# Patient Record
Sex: FEMALE | Race: BLACK OR AFRICAN AMERICAN | Employment: FULL TIME | URBAN - METROPOLITAN AREA
[De-identification: names, ages, dates, MRNs, and addresses within clinical notes are randomized per-mention and may not be internally consistent; named-entity substitution may affect disease eponyms.]

---

## 2017-12-06 ENCOUNTER — HOSPITAL ENCOUNTER (OUTPATIENT)
Dept: MAMMOGRAPHY | Age: 42
Discharge: HOME | End: 2017-12-06
Attending: INTERNAL MEDICINE
Payer: SELF-PAY

## 2017-12-06 DIAGNOSIS — Z12.39 SCREENING BREAST EXAMINATION: ICD-10-CM

## 2017-12-06 PROCEDURE — 77067 SCR MAMMO BI INCL CAD: CPT

## 2018-11-26 ENCOUNTER — HOSPITAL ENCOUNTER (OUTPATIENT)
Dept: MAMMOGRAPHY | Age: 43
Discharge: HOME | End: 2018-11-26
Attending: INTERNAL MEDICINE
Payer: SELF-PAY

## 2018-11-26 DIAGNOSIS — Z12.31 VISIT FOR SCREENING MAMMOGRAM: ICD-10-CM

## 2018-11-26 PROCEDURE — 77067 SCR MAMMO BI INCL CAD: CPT

## 2019-12-06 ENCOUNTER — HOSPITAL ENCOUNTER (OUTPATIENT)
Dept: MAMMOGRAPHY | Age: 44
Discharge: HOME | End: 2019-12-06
Attending: INTERNAL MEDICINE
Payer: MEDICAID

## 2019-12-06 DIAGNOSIS — Z12.31 VISIT FOR SCREENING MAMMOGRAM: ICD-10-CM

## 2019-12-06 PROCEDURE — 77067 SCR MAMMO BI INCL CAD: CPT

## 2020-11-16 ENCOUNTER — TRANSCRIBE ORDER (OUTPATIENT)
Dept: SCHEDULING | Age: 45
End: 2020-11-16

## 2020-11-16 DIAGNOSIS — Z12.31 VISIT FOR SCREENING MAMMOGRAM: Primary | ICD-10-CM

## 2020-11-17 ENCOUNTER — HOSPITAL ENCOUNTER (OUTPATIENT)
Dept: MAMMOGRAPHY | Age: 45
Discharge: HOME | End: 2020-11-17
Attending: INTERNAL MEDICINE
Payer: MEDICAID

## 2020-11-17 DIAGNOSIS — Z12.31 VISIT FOR SCREENING MAMMOGRAM: ICD-10-CM

## 2020-11-17 PROCEDURE — 77063 BREAST TOMOSYNTHESIS BI: CPT

## 2021-04-14 ENCOUNTER — TELEPHONE (OUTPATIENT)
Dept: INTERNAL MEDICINE CLINIC | Age: 46
End: 2021-04-14

## 2021-04-14 NOTE — TELEPHONE ENCOUNTER
----- Message from Natasha Aleman sent at 4/14/2021 11:45 AM EDT -----  Hello,    Patient has [facility] and Dr. Eugenio Eric is not credentialed yet.   Please move appointment

## 2021-04-21 ENCOUNTER — TELEPHONE (OUTPATIENT)
Dept: INTERNAL MEDICINE CLINIC | Age: 46
End: 2021-04-21

## 2021-04-21 DIAGNOSIS — Z30.09 UNWANTED FERTILITY: Primary | ICD-10-CM

## 2021-04-21 NOTE — TELEPHONE ENCOUNTER
Pt had to r/s her appt with you  Until end of June because you are not credentialed for [facility] . she said she only has one more refill of her birth control  And is asking if you could temporarily refill it until she is seen .  Her pharmacy is [facility]

## 2021-04-23 ENCOUNTER — TELEPHONE (OUTPATIENT)
Dept: INTERNAL MEDICINE CLINIC | Age: 46
End: 2021-04-23

## 2021-06-28 ENCOUNTER — OFFICE VISIT (OUTPATIENT)
Dept: INTERNAL MEDICINE CLINIC | Age: 46
End: 2021-06-28
Payer: MEDICAID

## 2021-06-28 VITALS
HEART RATE: 73 BPM | RESPIRATION RATE: 12 BRPM | WEIGHT: 210.8 LBS | SYSTOLIC BLOOD PRESSURE: 131 MMHG | DIASTOLIC BLOOD PRESSURE: 82 MMHG | OXYGEN SATURATION: 99 % | BODY MASS INDEX: 38.79 KG/M2 | TEMPERATURE: 97.8 F | HEIGHT: 62 IN

## 2021-06-28 DIAGNOSIS — F32.A ANXIETY AND DEPRESSION: ICD-10-CM

## 2021-06-28 DIAGNOSIS — J45.20 MILD INTERMITTENT ASTHMA WITHOUT COMPLICATION: Primary | ICD-10-CM

## 2021-06-28 DIAGNOSIS — E78.2 MIXED HYPERLIPIDEMIA: ICD-10-CM

## 2021-06-28 DIAGNOSIS — F41.9 ANXIETY AND DEPRESSION: ICD-10-CM

## 2021-06-28 DIAGNOSIS — Z80.3 FAMILY HISTORY OF BREAST CANCER IN MOTHER: ICD-10-CM

## 2021-06-28 PROCEDURE — 99214 OFFICE O/P EST MOD 30 MIN: CPT | Performed by: INTERNAL MEDICINE

## 2021-06-28 RX ORDER — IBUPROFEN 200 MG
800 TABLET ORAL
COMMUNITY
End: 2022-02-02 | Stop reason: DRUGHIGH

## 2021-06-28 RX ORDER — CITALOPRAM 10 MG/1
10 TABLET ORAL DAILY
COMMUNITY
End: 2022-02-01 | Stop reason: SDUPTHER

## 2021-06-28 RX ORDER — BISMUTH SUBSALICYLATE 262 MG
1 TABLET,CHEWABLE ORAL DAILY
COMMUNITY

## 2021-06-28 RX ORDER — ALBUTEROL SULFATE 90 UG/1
2 AEROSOL, METERED RESPIRATORY (INHALATION)
COMMUNITY
End: 2022-02-01 | Stop reason: SDUPTHER

## 2021-06-28 RX ORDER — CETIRIZINE HCL 10 MG
10 TABLET ORAL
COMMUNITY

## 2021-06-28 RX ORDER — GLUCOSAM/CHONDRO/HERB 149/HYAL 750-100 MG
1 TABLET ORAL DAILY
COMMUNITY

## 2021-06-28 RX ORDER — CHOLECALCIFEROL (VITAMIN D3) 125 MCG
5000 CAPSULE ORAL
COMMUNITY
End: 2022-02-01

## 2021-06-28 RX ORDER — FLUOCINOLONE ACETONIDE 0.11 MG/ML
OIL TOPICAL
COMMUNITY
End: 2022-02-10 | Stop reason: ALTCHOICE

## 2021-06-28 RX ORDER — MONTELUKAST SODIUM 10 MG/1
10 TABLET ORAL DAILY
COMMUNITY
End: 2021-06-28 | Stop reason: SDUPTHER

## 2021-06-28 RX ORDER — MONTELUKAST SODIUM 10 MG/1
10 TABLET ORAL DAILY
Qty: 90 TABLET | Refills: 3 | Status: SHIPPED | OUTPATIENT
Start: 2021-06-28 | End: 2022-02-01 | Stop reason: SDUPTHER

## 2021-06-28 NOTE — PROGRESS NOTES
1. Have you been to the ER, urgent care clinic or hospitalized since your last visit? NO           2. Have you seen or consulted any other health care providers outside of the [address] [place] [address] since your last visit (Include any pap smears or colon screening)? YES, Dr. Rory Izaguirre; Dr. Corin Moore, Dentist; Dr Gurwinder Del Cid, Optho    Do you have an Advanced Directive? YES    Would you like information on Advanced Directives?  NO

## 2021-06-28 NOTE — PROGRESS NOTES
HPI     Jayce Sheridan. Belinda Pozo is a 78-year-old female, known to me from my previous practice. She says her SSRI is controlling her anxiety very well. She takes Mobic sporadically, not even weekly. On questioning, she uses her rescue inhaler 2-3 times weekly, helpful. She states she has had both her Covid vaccines. Past Medical History:   Diagnosis Date    Allergic rhinitis     Anxiety and depression     Family history of breast cancer in mother     Mild intermittent asthma     Mixed hyperlipidemia         Past Surgical History:   Procedure Laterality Date    HX LEEP PROCEDURE          Current Outpatient Medications   Medication Sig Dispense Refill    citalopram (CeleXA) 10 mg tablet Take 10 mg by mouth daily.  cetirizine (ZyrTEC) 10 mg tablet Take 10 mg by mouth.  albuterol (PROVENTIL HFA, VENTOLIN HFA, PROAIR HFA) 90 mcg/actuation inhaler Take 2 Puffs by inhalation.  ibuprofen (Motrin IB) 200 mg tablet Take 800 mg by mouth.  biotin 5,000 mcg TbDi Take 5,000 mg by mouth.  omega 3-DHA-EPA-fish oil (Fish Oil) 1,000 mg (120 mg-180 mg) capsule Take 1 Capsule by mouth daily.  COLLAGEN by Does Not Apply route.  FLAXSEED OIL PO Take 1,400 mg by mouth.  multivitamin (ONE A DAY) tablet Take 1 Tablet by mouth daily.  fluocinolone 0.01 % oil by Scalp route.  wheat dextrin/calcium/aspartam (BENEFIBER + CALCIUM SUGAR-FREE PO) Take  by mouth.  COD LIVER OIL PO Take  by mouth.  montelukast (Singulair) 10 mg tablet Take 1 Tablet by mouth daily. 90 Tablet 3    norethindrone-ethinyl estradiol (ORTHO-NOVUM 1-35 TAB, NORTREL 1-35 TAB) 1-35 mg-mcg tab Take 1 Tab by mouth daily.  3 Package 3        No Known Allergies     Social History     Socioeconomic History    Marital status: SINGLE     Spouse name: Not on file    Number of children: Not on file    Years of education: Not on file    Highest education level: Not on file   Occupational History    Not on file Tobacco Use    Smoking status: Former Smoker     Packs/day: 0.10     Years: 3.00     Pack years: 0.30    Smokeless tobacco: Never Used   Vaping Use    Vaping Use: Never used   Substance and Sexual Activity    Alcohol use: Not on file    Drug use: Not on file    Sexual activity: Not on file   Other Topics Concern    Not on file   Social History Narrative    Not on file     Social Determinants of Health     Financial Resource Strain:     Difficulty of Paying Living Expenses:    Food Insecurity:     Worried About Running Out of Food in the Last Year:     [address] in the Last Year:    Transportation Needs:     Lack of Transportation (Medical):  Lack of Transportation (Non-Medical):    Physical Activity:     Days of Exercise per Week:     Minutes of Exercise per Session:    Stress:     Feeling of Stress :    Social Connections:     Frequency of Communication with Friends and Family:     Frequency of Social Gatherings with Friends and Family:     Attends Shinto Services:     Active Member of Clubs or Organizations:     Attends Club or Organization Meetings:     Marital Status:    Intimate Partner Violence:     Fear of Current or Ex-Partner:     Emotionally Abused:     Physically Abused:     Sexually Abused:         Family History   Problem Relation Age of Onset    Breast Cancer Mother         at 55    Diabetes Sister         type 2           Visit Vitals  /82 (BP 1 Location: Right upper arm, BP Patient Position: Sitting)   Pulse 73   Temp 97.8 °F (36.6 °C) (Temporal)   Resp 12   Ht 5' 2\" (1.575 m)   Wt 210 lb 12.8 oz (95.6 kg)   SpO2 99%   BMI 38.56 kg/m²        Review of Systems   Eyes: Negative for blurred vision. Respiratory: Negative for shortness of breath. Cardiovascular: Negative for chest pain. Gastrointestinal: Negative for blood in stool. Genitourinary: Negative for hematuria. Neurological: Negative for headaches.    Psychiatric/Behavioral: Negative for depression. The patient is not nervous/anxious. Physical Exam  Constitutional:       General: She is not in acute distress. Appearance: She is obese. Eyes:      General: No scleral icterus. Conjunctiva/sclera: Conjunctivae normal.   Neck:      Comments: Normal carotid upstrokes. Cardiovascular:      Rate and Rhythm: Normal rate and regular rhythm. Pulses: Normal pulses. Heart sounds: No friction rub. No gallop. Pulmonary:      Effort: Pulmonary effort is normal.      Breath sounds: Normal breath sounds. Abdominal:      General: Abdomen is flat. Palpations: Abdomen is soft. Tenderness: There is no abdominal tenderness. Musculoskeletal:      Cervical back: Neck supple. Comments: No clubbing, cyanosis, or edema. Skin:     General: Skin is warm and dry. Neurological:      Mental Status: She is alert and oriented to person, place, and time. Psychiatric:         Mood and Affect: Mood normal.                  Diagnoses and all orders for this visit:    1. Mild intermittent asthma without complication  Comments:  Controlled, refill Singulair. Advise me if having to use rescue inhaler more than 2-3 times weekly on a regular basis. 2. Mixed hyperlipidemia  Comments:  Fasting panel shortly before next visit. Orders:  -     LIPID PANEL; Future  -     METABOLIC PANEL, COMPREHENSIVE; Future  -     URINALYSIS W/MICROSCOPIC; Future    3. Anxiety and depression  Comments:  Controlled, refill SSRI    4. Family history of breast cancer in mother  Comments:  Up-to-date mammogram November 2020, will do breast exam next visit. Other orders  -     montelukast (Singulair) 10 mg tablet; Take 1 Tablet by mouth daily. Follow-up and Dispositions    · Return in about 28 weeks (around 1/10/2022) for 30 minute physical-- schedule labs 1 or 2 wks before.               Shellie Nicholson MD

## 2021-11-12 ENCOUNTER — TELEPHONE (OUTPATIENT)
Dept: INTERNAL MEDICINE CLINIC | Age: 46
End: 2021-11-12

## 2021-11-12 DIAGNOSIS — R05.9 COUGH: Primary | ICD-10-CM

## 2021-11-12 RX ORDER — HYDROCODONE POLISTIREX AND CHLORPHENIRAMINE POLISTIREX 10; 8 MG/5ML; MG/5ML
5 SUSPENSION, EXTENDED RELEASE ORAL
Qty: 120 ML | Refills: 0 | Status: SHIPPED | OUTPATIENT
Start: 2021-11-12 | End: 2021-11-19 | Stop reason: ALTCHOICE

## 2021-11-12 NOTE — TELEPHONE ENCOUNTER
Pt has had a cough (no fever) that started 11/09- to has been treating it with robitussin she states it is not helping she said she has this before and ws prescribed Tussinex and it helped .  she is asking if you could call something into her pharmacy

## 2021-11-17 ENCOUNTER — DOCUMENTATION ONLY (OUTPATIENT)
Dept: INTERNAL MEDICINE CLINIC | Age: 46
End: 2021-11-17

## 2021-11-17 ENCOUNTER — TELEPHONE (OUTPATIENT)
Dept: INTERNAL MEDICINE CLINIC | Age: 46
End: 2021-11-17

## 2021-11-17 NOTE — TELEPHONE ENCOUNTER
----- Message from Everett Gonzalez LPN sent at 01/63/4634 11:59 AM EST -----  Regarding: FW: Trevormartharakel     ----- Message -----  From: Siobhan Dowell  Sent: 11/16/2021  10:41 AM EST  To: [facility]  Subject: Jonox                                         [facility] would not fill the rx for the full rx you requested, they only did a 7 day supply. I was informed that you would have to send in another rx for the remainder. ..this has really been helping alot with the might time cough. .not for sure if you will fill the remaining RX without being seen. I know I have an upcoming appt in January. Thank you for your assistance.

## 2021-11-17 NOTE — TELEPHONE ENCOUNTER
If she is still coughing, should probably be evaluated, can do a virtual visit or go to urgent care, if she was not checked for Covid, she might want to do so at urgent care or a pharmacy like [facility].

## 2021-11-18 ENCOUNTER — TRANSCRIBE ORDER (OUTPATIENT)
Dept: SCHEDULING | Age: 46
End: 2021-11-18

## 2021-11-18 DIAGNOSIS — Z12.31 VISIT FOR SCREENING MAMMOGRAM: Primary | ICD-10-CM

## 2021-11-19 ENCOUNTER — VIRTUAL VISIT (OUTPATIENT)
Dept: INTERNAL MEDICINE CLINIC | Age: 46
End: 2021-11-19
Payer: MEDICAID

## 2021-11-19 DIAGNOSIS — R05.9 COUGH: Primary | ICD-10-CM

## 2021-11-19 PROCEDURE — 99213 OFFICE O/P EST LOW 20 MIN: CPT | Performed by: INTERNAL MEDICINE

## 2021-11-19 RX ORDER — HYDROCODONE POLISTIREX AND CHLORPHENIRAMINE POLISTIREX 10; 8 MG/5ML; MG/5ML
5 SUSPENSION, EXTENDED RELEASE ORAL
Qty: 100 ML | Refills: 0 | Status: SHIPPED | OUTPATIENT
Start: 2021-11-19 | End: 2021-11-23 | Stop reason: SDUPTHER

## 2021-11-21 NOTE — PROGRESS NOTES
Maggie Cobb is a 48 y.o. female who was seen by synchronous (real-time) audio-video technology on 11/19/2021 for No chief complaint on file. Dry cough      Assessment & Plan:   Diagnoses and all orders for this visit:    1. Cough  Comments:  Seasonal, could be allergic. Continue Singulair, Zyrtec, Flonase, as needed inhaler. Tussionex 120 cc no refills. Orders:  -     HYDROcodone-chlorpheniramine (TUSSIONEX) 10-8 mg/5 mL suspension; Take 5 mL by mouth every twelve (12) hours as needed for Cough for up to 10 days. Max Daily Amount: 10 mL. 712  Subjective:       Prior to Admission medications    Medication Sig Start Date End Date Taking? Authorizing Provider   HYDROcodone-chlorpheniramine (TUSSIONEX) 10-8 mg/5 mL suspension Take 5 mL by mouth every twelve (12) hours as needed for Cough for up to 10 days. Max Daily Amount: 10 mL. 11/19/21 11/29/21 Yes Dominic Dimas MD   citalopram (CeleXA) 10 mg tablet Take 10 mg by mouth daily. Provider, Historical   cetirizine (ZyrTEC) 10 mg tablet Take 10 mg by mouth. Provider, Historical   albuterol (PROVENTIL HFA, VENTOLIN HFA, PROAIR HFA) 90 mcg/actuation inhaler Take 2 Puffs by inhalation. Provider, Historical   ibuprofen (Motrin IB) 200 mg tablet Take 800 mg by mouth. Provider, Historical   biotin 5,000 mcg TbDi Take 5,000 mg by mouth. Provider, Historical   omega 3-DHA-EPA-fish oil (Fish Oil) 1,000 mg (120 mg-180 mg) capsule Take 1 Capsule by mouth daily. Provider, Historical   COLLAGEN by Does Not Apply route. Provider, Historical   FLAXSEED OIL PO Take 1,400 mg by mouth. Provider, Historical   multivitamin (ONE A DAY) tablet Take 1 Tablet by mouth daily. Provider, Historical   fluocinolone 0.01 % oil by Scalp route. Provider, Historical   wheat dextrin/calcium/aspartam (BENEFIBER + CALCIUM SUGAR-FREE PO) Take  by mouth. Provider, Historical   COD LIVER OIL PO Take  by mouth.     Provider, Historical   montelukast (Singulair) 10 mg tablet Take 1 Tablet by mouth daily. 6/28/21   Aide Duke MD   norethindrone-ethinyl estradiol (ORTHO-NOVUM 1-35 TAB, NORTREL 1-35 TAB) 1-35 mg-mcg tab Take 1 Tab by mouth daily. 4/23/21   Aide Duke MD         Review of Systems   Constitutional: Positive for weight loss. Negative for chills and fever. HENT:        No loss of taste, no loss of smell. Respiratory: Negative for sputum production, shortness of breath and wheezing. Cardiovascular: Negative for chest pain. Gastrointestinal: Negative for abdominal pain, diarrhea, nausea and vomiting. Genitourinary: Negative for frequency. Skin: Negative for rash. Neurological: Negative for headaches. Objective:   No flowsheet data found. General: alert, cooperative, no distress   Mental  status: normal mood, behavior, speech, dress, motor activity, and thought processes, able to follow commands   HENT: NCAT   Neck: no visualized mass   Resp: no respiratory distress   Neuro: no gross deficits   Skin: no discoloration or lesions of concern on visible areas   Psychiatric: normal affect, consistent with stated mood, no evidence of hallucinations     Additional exam findings: We discussed the expected course, resolution and complications of the diagnosis(es) in detail. Medication risks, benefits, costs, interactions, and alternatives were discussed as indicated. I advised her to contact the office if her condition worsens, changes or fails to improve as anticipated. She expressed understanding with the diagnosis(es) and plan. Reina Lucia, was evaluated through a synchronous (real-time) audio-video encounter. The patient (or guardian if applicable) is aware that this is a billable service. Verbal consent to proceed has been obtained within the past 12 months.  The visit was conducted pursuant to the emergency declaration under the [facility], [facility]5 waiver authority and the Coronavirus Preparedness and Response Supplemental Appropriations Act. Patient identification was verified, and a caregiver was present when appropriate. The patient was located in a state where the provider was credentialed to provide care.     Kostas Alexander MD

## 2021-11-23 ENCOUNTER — DOCUMENTATION ONLY (OUTPATIENT)
Dept: INTERNAL MEDICINE CLINIC | Age: 46
End: 2021-11-23

## 2021-11-23 ENCOUNTER — TELEPHONE (OUTPATIENT)
Dept: INTERNAL MEDICINE CLINIC | Age: 46
End: 2021-11-23

## 2021-11-23 DIAGNOSIS — R05.9 COUGH: ICD-10-CM

## 2021-11-23 RX ORDER — HYDROCODONE POLISTIREX AND CHLORPHENIRAMINE POLISTIREX 10; 8 MG/5ML; MG/5ML
5 SUSPENSION, EXTENDED RELEASE ORAL
Qty: 100 ML | Refills: 0 | Status: SHIPPED | OUTPATIENT
Start: 2021-11-23 | End: 2021-12-03

## 2021-11-23 NOTE — TELEPHONE ENCOUNTER
Pt calling about Tussionex. She was made aware it was sent to [facility], [address] She needed it sent to [address] [place] [address]'s does not give her the full RX but [facility] will         Asking please cancel the RX to [facility] and send prescription to [facility].

## 2021-11-24 ENCOUNTER — HOSPITAL ENCOUNTER (OUTPATIENT)
Dept: MAMMOGRAPHY | Age: 46
Discharge: HOME | End: 2021-11-24
Attending: INTERNAL MEDICINE
Payer: MEDICAID

## 2021-11-24 DIAGNOSIS — Z12.31 VISIT FOR SCREENING MAMMOGRAM: ICD-10-CM

## 2021-11-24 PROCEDURE — 77063 BREAST TOMOSYNTHESIS BI: CPT

## 2021-12-28 ENCOUNTER — TELEPHONE (OUTPATIENT)
Dept: INTERNAL MEDICINE CLINIC | Age: 46
End: 2021-12-28

## 2021-12-28 DIAGNOSIS — E78.2 MIXED HYPERLIPIDEMIA: ICD-10-CM

## 2021-12-28 NOTE — TELEPHONE ENCOUNTER
The lab orders are in there, schedule her an appointment please to have done within the next week to 10 days.

## 2021-12-28 NOTE — TELEPHONE ENCOUNTER
----- Message from Danita Betsy sent at 12/28/2021  8:51 AM EST -----  Subject: Message to Provider    QUESTIONS  Information for Provider? Pt re scheduled her appt. and needs to know if   she needs a new lab appt. Please advise PT.  ---------------------------------------------------------------------------  --------------  CALL BACK INFO  What is the best way for the office to contact you? OK to leave message on   voicemail  Preferred Call Back Phone Number? [phone]  ---------------------------------------------------------------------------  --------------  SCRIPT ANSWERS  Relationship to Patient?  Self

## 2022-01-26 DIAGNOSIS — Z30.09 UNWANTED FERTILITY: ICD-10-CM

## 2022-01-26 NOTE — TELEPHONE ENCOUNTER
Last Visit: 11/19/21 with MD Chayito Torres  Next Appointment: 2/1/22 with MD Chayito Torres  Previous Refill Encounter(s): 4/23/21 #3 with 3 refills    Requested Prescriptions     Pending Prescriptions Disp Refills    norethindrone-ethinyl estradiol (ORTHO-NOVUM 1-35 TAB, NORTREL 1-35 TAB) 1-35 mg-mcg tab 84 Tablet 3     Sig: Take 1 Tablet by mouth daily.

## 2022-01-28 ENCOUNTER — APPOINTMENT (OUTPATIENT)
Dept: INTERNAL MEDICINE CLINIC | Age: 47
End: 2022-01-28

## 2022-01-29 LAB
A-G RATIO,AGRAT: 1.2 RATIO (ref 1.1–2.6)
ALBUMIN SERPL-MCNC: 3.9 G/DL (ref 3.5–5)
ALP SERPL-CCNC: 65 U/L (ref 25–115)
ALT SERPL-CCNC: 11 U/L (ref 5–40)
ANION GAP SERPL CALC-SCNC: 13 MMOL/L (ref 3–15)
AST SERPL W P-5'-P-CCNC: 20 U/L (ref 10–37)
BILIRUB SERPL-MCNC: 0.3 MG/DL (ref 0.2–1.2)
BUN SERPL-MCNC: 11 MG/DL (ref 6–22)
CALCIUM SERPL-MCNC: 9.1 MG/DL (ref 8.4–10.5)
CHLORIDE SERPL-SCNC: 104 MMOL/L (ref 98–110)
CHOLEST SERPL-MCNC: 211 MG/DL (ref 110–200)
CO2 SERPL-SCNC: 24 MMOL/L (ref 20–32)
CREAT SERPL-MCNC: 0.6 MG/DL (ref 0.5–1.2)
GFRAA, 66117: >60
GFRNA, 66118: >60
GLOBULIN,GLOB: 3.2 G/DL (ref 2–4)
GLUCOSE SERPL-MCNC: 77 MG/DL (ref 70–99)
HDLC SERPL-MCNC: 2.8 MG/DL (ref 0–5)
HDLC SERPL-MCNC: 75 MG/DL
LDL/HDL RATIO,LDHD: 1.7
LDLC SERPL CALC-MCNC: 125 MG/DL (ref 50–99)
NON-HDL CHOLESTEROL, 011976: 136 MG/DL
POTASSIUM SERPL-SCNC: 4.4 MMOL/L (ref 3.5–5.5)
PROT SERPL-MCNC: 7.1 G/DL (ref 6.4–8.3)
SODIUM SERPL-SCNC: 141 MMOL/L (ref 133–145)
TRIGL SERPL-MCNC: 57 MG/DL (ref 40–149)
VLDLC SERPL CALC-MCNC: 11 MG/DL (ref 8–30)

## 2022-02-01 ENCOUNTER — PATIENT MESSAGE (OUTPATIENT)
Dept: INTERNAL MEDICINE CLINIC | Age: 47
End: 2022-02-01

## 2022-02-01 ENCOUNTER — OFFICE VISIT (OUTPATIENT)
Dept: INTERNAL MEDICINE CLINIC | Age: 47
End: 2022-02-01
Payer: MEDICAID

## 2022-02-01 VITALS
TEMPERATURE: 97 F | SYSTOLIC BLOOD PRESSURE: 123 MMHG | OXYGEN SATURATION: 97 % | BODY MASS INDEX: 38.39 KG/M2 | RESPIRATION RATE: 18 BRPM | WEIGHT: 208.6 LBS | HEART RATE: 77 BPM | HEIGHT: 62 IN | DIASTOLIC BLOOD PRESSURE: 76 MMHG

## 2022-02-01 DIAGNOSIS — E78.2 MIXED HYPERLIPIDEMIA: ICD-10-CM

## 2022-02-01 DIAGNOSIS — Z00.00 ROUTINE PHYSICAL EXAMINATION: Primary | ICD-10-CM

## 2022-02-01 DIAGNOSIS — F41.9 ANXIETY AND DEPRESSION: ICD-10-CM

## 2022-02-01 DIAGNOSIS — Z12.4 CERVICAL CANCER SCREENING: ICD-10-CM

## 2022-02-01 DIAGNOSIS — F32.A ANXIETY AND DEPRESSION: ICD-10-CM

## 2022-02-01 DIAGNOSIS — R21 RASH AND NONSPECIFIC SKIN ERUPTION: ICD-10-CM

## 2022-02-01 PROCEDURE — 99396 PREV VISIT EST AGE 40-64: CPT | Performed by: INTERNAL MEDICINE

## 2022-02-01 RX ORDER — CLOTRIMAZOLE AND BETAMETHASONE DIPROPIONATE 10; .64 MG/G; MG/G
CREAM TOPICAL
Qty: 30 G | Refills: 5 | Status: SHIPPED | OUTPATIENT
Start: 2022-02-01 | End: 2022-02-10 | Stop reason: ALTCHOICE

## 2022-02-01 RX ORDER — MONTELUKAST SODIUM 10 MG/1
10 TABLET ORAL DAILY
Qty: 90 TABLET | Refills: 3 | Status: SHIPPED | OUTPATIENT
Start: 2022-02-01

## 2022-02-01 RX ORDER — CITALOPRAM 10 MG/1
10 TABLET ORAL DAILY
Qty: 90 TABLET | Refills: 2 | Status: SHIPPED | OUTPATIENT
Start: 2022-02-01 | End: 2022-08-05 | Stop reason: SDUPTHER

## 2022-02-01 RX ORDER — ALBUTEROL SULFATE 90 UG/1
AEROSOL, METERED RESPIRATORY (INHALATION)
Qty: 18 G | Refills: 5 | Status: SHIPPED | OUTPATIENT
Start: 2022-02-01

## 2022-02-01 NOTE — PROGRESS NOTES
Pamela Loredo presents today for   Chief Complaint   Patient presents with    Physical       Is someone accompanying this pt? no    Is the patient using any DME equipment during OV? no    Depression Screening:  3 most recent PHQ Screens 2/1/2022   Little interest or pleasure in doing things Not at all   Feeling down, depressed, irritable, or hopeless Not at all   Total Score PHQ 2 0       Learning Assessment:  No flowsheet data found. Health Maintenance reviewed and discussed and ordered per Provider. Health Maintenance Due   Topic Date Due    Hepatitis C Screening  Never done    Pneumococcal 0-64 years (1 of 2 - PPSV23) Never done    DTaP/Tdap/Td series (1 - Tdap) Never done    Colorectal Cancer Screening Combo  Never done    Cervical cancer screen  10/28/2019    Flu Vaccine (1) Never done    COVID-19 Vaccine (3 - Booster for [employer] series) 10/20/2021   . Coordination of Care:  1. Have you been to the ER, urgent care clinic since your last visit? Hospitalized since your last visit? no    2. Have you seen or consulted any other health care providers outside of the [address] [place] [address] since your last visit? Include any pap smears or colon screening. no    3. For patients aged 39-70: Has the patient had a colonoscopy? No     If the patient is female:    4. For patients aged 41-77: Has the patient had a mammogram within the past 2 years? Yes - no Care Gap present    5. For patients aged 21-65: Has the patient had a pap smear?  No

## 2022-02-01 NOTE — PROGRESS NOTES
HPI     Amy Basurto is here for routine physical exam.  Her mood is doing fine. She thinks she is due for Pap, believes it was last done in 2015. They have always been normal.  The last Pap result that I see in the chart is actually from 2014. She needs refills on some medications, including topicals for her inframammary intertrigo, as well as for her eczema. She requests a refill on her rescue inhaler, though she does not usually have to use that very much. Past Medical History:   Diagnosis Date    Allergic rhinitis     Anxiety and depression     Family history of breast cancer in mother     Mild intermittent asthma     Mixed hyperlipidemia         Past Surgical History:   Procedure Laterality Date    HX LEEP PROCEDURE          Current Outpatient Medications   Medication Sig Dispense Refill    citalopram (CeleXA) 10 mg tablet Take 1 Tablet by mouth daily. 90 Tablet 2    albuterol (PROVENTIL HFA, VENTOLIN HFA, PROAIR HFA) 90 mcg/actuation inhaler 1 or 2 puffs p.o. every 4 hours as needed dyspnea or wheezing. 18 g 5    montelukast (Singulair) 10 mg tablet Take 1 Tablet by mouth daily. 90 Tablet 3    clotrimazole-betamethasone (LOTRISONE) topical cream Pea-sized amount to affected areas twice daily as needed 30 g 5    norethindrone-ethinyl estradiol (ORTHO-NOVUM 1-35 TAB, NORTREL 1-35 TAB) 1-35 mg-mcg tab Take 1 Tablet by mouth daily. 84 Tablet 3    cetirizine (ZyrTEC) 10 mg tablet Take 10 mg by mouth.  ibuprofen (Motrin IB) 200 mg tablet Take 800 mg by mouth.  omega 3-DHA-EPA-fish oil (Fish Oil) 1,000 mg (120 mg-180 mg) capsule Take 1 Capsule by mouth daily.  COLLAGEN by Does Not Apply route.  FLAXSEED OIL PO Take 1,400 mg by mouth.  multivitamin (ONE A DAY) tablet Take 1 Tablet by mouth daily.  fluocinolone 0.01 % oil by Scalp route.  wheat dextrin/calcium/aspartam (BENEFIBER + CALCIUM SUGAR-FREE PO) Take  by mouth.  COD LIVER OIL PO Take  by mouth. No Known Allergies     Social History     Socioeconomic History    Marital status: SINGLE     Spouse name: Not on file    Number of children: Not on file    Years of education: Not on file    Highest education level: Not on file   Occupational History    Not on file   Tobacco Use    Smoking status: Former Smoker     Packs/day: 0.10     Years: 3.00     Pack years: 0.30    Smokeless tobacco: Never Used   Vaping Use    Vaping Use: Never used   Substance and Sexual Activity    Alcohol use: Not on file    Drug use: Not on file    Sexual activity: Not on file   Other Topics Concern    Not on file   Social History Narrative    Not on file     Social Determinants of Health     Financial Resource Strain:     Difficulty of Paying Living Expenses: Not on file   Food Insecurity:     Worried About Running Out of Food in the Last Year: Not on file    Arielle of Food in the Last Year: Not on file   Transportation Needs:     Lack of Transportation (Medical): Not on file    Lack of Transportation (Non-Medical):  Not on file   Physical Activity:     Days of Exercise per Week: Not on file    Minutes of Exercise per Session: Not on file   Stress:     Feeling of Stress : Not on file   Social Connections:     Frequency of Communication with Friends and Family: Not on file    Frequency of Social Gatherings with Friends and Family: Not on file    Attends Bahai Services: Not on file    Active Member of [address] [place] [address] or Organizations: Not on file    Attends Club or Organization Meetings: Not on file    Marital Status: Not on file   Intimate Partner Violence:     Fear of Current or Ex-Partner: Not on file    Emotionally Abused: Not on file    Physically Abused: Not on file    Sexually Abused: Not on file   Housing Stability:     Unable to Pay for Housing in the Last Year: Not on file    Number of [place] in the Last Year: Not on file    Unstable Housing in the Last Year: Not on file        Family History   Problem Relation Age of Onset    Breast Cancer Mother         at 55    Diabetes Sister         type 2           Visit Vitals  /76   Pulse 77   Temp 97 °F (36.1 °C) (Temporal)   Resp 18   Ht 5' 2\" (1.575 m)   Wt 208 lb 9.6 oz (94.6 kg)   LMP 01/30/2021 (Exact Date) Comment: ended   SpO2 97%   BMI 38.15 kg/m²        Review of Systems   Respiratory: Negative for shortness of breath. Cardiovascular: Negative for chest pain. Gastrointestinal: Negative for blood in stool. Genitourinary: Negative for hematuria. Psychiatric/Behavioral: Negative for depression. The patient is not nervous/anxious. Physical Exam  Constitutional:       General: She is not in acute distress. Appearance: She is obese. HENT:      Right Ear: Tympanic membrane, ear canal and external ear normal. There is no impacted cerumen. Left Ear: Tympanic membrane, ear canal and external ear normal. There is no impacted cerumen. Eyes:      General: No scleral icterus. Conjunctiva/sclera: Conjunctivae normal.   Neck:      Comments: Bilateral carotid upstrokes normal to palpation. Cardiovascular:      Rate and Rhythm: Normal rate and regular rhythm. Pulses: Normal pulses. Heart sounds: No friction rub. No gallop. Pulmonary:      Effort: Pulmonary effort is normal.      Breath sounds: Normal breath sounds. Abdominal:      Palpations: Abdomen is soft. Tenderness: There is no abdominal tenderness. Genitourinary:     Comments: Breasts: No mass, discharge, or skin changes bilaterally. Pelvic: Normal external vaginal area, cervix without discharge or lesions. No cervical motion tenderness, no adnexal mass appreciated. Musculoskeletal:      Cervical back: Neck supple. Comments: No clubbing, cyanosis, or edema. Skin:     General: Skin is warm and dry. Neurological:      Mental Status: She is alert and oriented to person, place, and time.    Psychiatric:         Mood and Affect: Mood normal.                  Diagnoses and all orders for this visit:    1. Routine physical examination  Comments:  Non-smoker, normal blood pressure, normal breast exam.    2. Cervical cancer screening  Comments:  Pap sent with HPV cotesting  Orders:  -     PAP (IMAGE GUIDED), LIQUID-BASED  -     HPV, 16/18,45    3. Mixed hyperlipidemia  Comments: With low [place] 10-year MI risk    4. Anxiety and depression  Comments:  Controlled, continue Celexa, refills sent    5. Rash and nonspecific skin eruption  Comments:  Inframammary, Lotrisone refilled  Orders:  -     clotrimazole-betamethasone (LOTRISONE) topical cream; Pea-sized amount to affected areas twice daily as needed    Other orders  -     citalopram (CeleXA) 10 mg tablet; Take 1 Tablet by mouth daily. -     albuterol (PROVENTIL HFA, VENTOLIN HFA, PROAIR HFA) 90 mcg/actuation inhaler; 1 or 2 puffs p.o. every 4 hours as needed dyspnea or wheezing.  -     montelukast (Singulair) 10 mg tablet; Take 1 Tablet by mouth daily.                   Mell Watson MD

## 2022-02-02 RX ORDER — IBUPROFEN 800 MG/1
800 TABLET ORAL
Qty: 90 TABLET | Refills: 1 | Status: SHIPPED | OUTPATIENT
Start: 2022-02-02

## 2022-02-02 NOTE — TELEPHONE ENCOUNTER
Last Visit: 2/1/22 with MD Jared Sullivan  Next Appointment: 8/2/22 with MD Jared Sullivan    Requested Prescriptions     Pending Prescriptions Disp Refills    ibuprofen (MOTRIN) 800 mg tablet 90 Tablet 1     Sig: Take 1 Tablet by mouth three (3) times daily as needed (Take with food).        From outside meds tab:

## 2022-02-02 NOTE — TELEPHONE ENCOUNTER
From: Susana Rahman  To: Jeremy Padilla MD  Sent: 2/1/2022 7:08 PM EST  Subject: Prescription Refills     I forgot about this medication that need to be refilled:  Ibuprofen 800mg  Thank you

## 2022-02-03 ENCOUNTER — TELEPHONE (OUTPATIENT)
Dept: INTERNAL MEDICINE CLINIC | Age: 47
End: 2022-02-03

## 2022-02-09 LAB
HPV HIGH RISK, 507303: NEGATIVE
PAP IMAGE GUIDED, 8900296: NORMAL

## 2022-02-10 ENCOUNTER — TELEPHONE (OUTPATIENT)
Dept: INTERNAL MEDICINE CLINIC | Age: 47
End: 2022-02-10

## 2022-02-10 DIAGNOSIS — R87.618 UNEXPLAINED ENDOMETRIAL CELLS ON CERVICAL PAP SMEAR: Primary | ICD-10-CM

## 2022-02-10 NOTE — TELEPHONE ENCOUNTER
Her Pap showed some cells from her uterus. Could be normal, but I recommend pelvic ultrasound to make sure the lining of her uterus is not thickened. Pap was otherwise normal, negative for HPV.   I will put an order in for the ultrasound, and she should be hearing from central scheduling soon

## 2022-02-24 DIAGNOSIS — R87.618 UNEXPLAINED ENDOMETRIAL CELLS ON CERVICAL PAP SMEAR: ICD-10-CM

## 2022-03-11 ENCOUNTER — HOSPITAL ENCOUNTER (OUTPATIENT)
Dept: ULTRASOUND IMAGING | Age: 47
Discharge: HOME | End: 2022-03-11
Attending: INTERNAL MEDICINE
Payer: MEDICAID

## 2022-03-11 DIAGNOSIS — R87.618 UNEXPLAINED ENDOMETRIAL CELLS ON CERVICAL PAP SMEAR: ICD-10-CM

## 2022-03-11 PROCEDURE — 93975 VASCULAR STUDY: CPT

## 2022-03-17 ENCOUNTER — TELEPHONE (OUTPATIENT)
Dept: INTERNAL MEDICINE CLINIC | Age: 47
End: 2022-03-17

## 2022-03-17 DIAGNOSIS — R87.618 UNEXPLAINED ENDOMETRIAL CELLS ON CERVICAL PAP SMEAR: Primary | ICD-10-CM

## 2022-03-17 DIAGNOSIS — D25.9 UTERINE LEIOMYOMA, UNSPECIFIED LOCATION: ICD-10-CM

## 2022-03-17 NOTE — TELEPHONE ENCOUNTER
Pelvic ultrasound shows fibroid uterus, endometrial lining not well visualized. Had unexplained endometrial cells on recent Pap smear, MRI of pelvis ordered to further evaluate endometrial lining. Patient in agreement.

## 2022-03-17 NOTE — TELEPHONE ENCOUNTER
Regarding: Ultra Sound Results   ----- Message from Barbara Harmon LPN sent at 9/23/9183 10:35 AM EDT -----       ----- Message from Tejinder Hernandes to Jimbo Myles MD sent at 3/17/2022 10:32 AM -----   Good morning. .yes I would like Dr. Adry Jacobo to order the MRI. Thank you and have a great day.      ----- Message -----       From:Adwoa GOLDSMITH       Sent:3/17/2022  9:11 AM EDT         To:Mary Kay Tatum    Subject:Ultra Sound Results     Good Morning,    Dr. Adry Jacobo stated fibroids themselves are not cancerous, but the radiologist did say that he could not see the lining of her uterus very well due to the fibroids. An MRI as recommended to get a better look at the fibroids, Would you like for her to order the test?    Thank you,  Marry Tam. FELICIANO       ----- Message -----       From:Mary Kay Tatum       Sent:3/15/2022 11:47 AM EDT         MD Erin Shin Results      Dr. Adry Jacobo,  Hopefully all is well. I received my results and wanted to know should I be concerned that these fibroids are cancerous and what are the next steps.  Thank you for your attention in this matter

## 2022-03-24 DIAGNOSIS — D25.9 UTERINE LEIOMYOMA, UNSPECIFIED LOCATION: ICD-10-CM

## 2022-03-24 DIAGNOSIS — R87.618 UNEXPLAINED ENDOMETRIAL CELLS ON CERVICAL PAP SMEAR: ICD-10-CM

## 2022-04-02 ENCOUNTER — HOSPITAL ENCOUNTER (OUTPATIENT)
Age: 47
Discharge: HOME | End: 2022-04-02
Attending: INTERNAL MEDICINE
Payer: MEDICAID

## 2022-04-02 DIAGNOSIS — D25.9 UTERINE LEIOMYOMA, UNSPECIFIED LOCATION: ICD-10-CM

## 2022-04-02 DIAGNOSIS — R87.618 UNEXPLAINED ENDOMETRIAL CELLS ON CERVICAL PAP SMEAR: ICD-10-CM

## 2022-04-02 PROCEDURE — 72197 MRI PELVIS W/O & W/DYE: CPT

## 2022-04-02 PROCEDURE — 74011250636 HC RX REV CODE- 250/636: Performed by: INTERNAL MEDICINE

## 2022-04-02 PROCEDURE — A9577 INJ MULTIHANCE: HCPCS | Performed by: INTERNAL MEDICINE

## 2022-04-02 RX ADMIN — GADOBENATE DIMEGLUMINE 20 ML: 529 INJECTION, SOLUTION INTRAVENOUS at 18:06

## 2022-04-11 ENCOUNTER — TELEPHONE (OUTPATIENT)
Dept: INTERNAL MEDICINE CLINIC | Age: 47
End: 2022-04-11

## 2022-04-11 DIAGNOSIS — R87.618 UNEXPLAINED ENDOMETRIAL CELLS ON CERVICAL PAP SMEAR: Primary | ICD-10-CM

## 2022-04-11 NOTE — TELEPHONE ENCOUNTER
Left name and call back number. See message below.         MRI of her pelvis confirms that she has several fibroids, but everything else looks normal .

## 2022-04-12 ENCOUNTER — TELEPHONE (OUTPATIENT)
Dept: INTERNAL MEDICINE CLINIC | Age: 47
End: 2022-04-12

## 2022-04-12 NOTE — TELEPHONE ENCOUNTER
To be sure that the cells from her uterus that we saw on her Pap are not an abnormality, recommend seeing GYN. Does she have someone in mind, has she seen anybody before, which she like me to recommend somebody?

## 2022-04-12 NOTE — TELEPHONE ENCOUNTER
Viola Jackson routed conversation to [facility] 59 minutes ago (10:15 AM)     Valeria JHA 1 hour ago (10:15 AM)     TL       Pt returned nurse's call.   Please call her back on her cell at [phone]

## 2022-04-12 NOTE — TELEPHONE ENCOUNTER
Patient aware of message below and verbalized understanding. Patient wants to know what the next step is. Please advise.

## 2022-05-23 ENCOUNTER — TELEPHONE (OUTPATIENT)
Dept: INTERNAL MEDICINE CLINIC | Age: 47
End: 2022-05-23

## 2022-05-23 NOTE — TELEPHONE ENCOUNTER
Faxed requested docs to [facility].  See below    Pt was referred to [facility] , Magda Rhodes is calling asking for pt's MRI and vaginal Ultrsound along with Pap smear notes to be faxed to them   FAX#   [phone]

## 2022-05-23 NOTE — TELEPHONE ENCOUNTER
Pt was referred to [facility]  [facility] is calling asking for pt's MRI and vaginal Ultrsound along with Pap smear notes to be faxed to them   FAX#   [phone]

## 2022-08-05 ENCOUNTER — OFFICE VISIT (OUTPATIENT)
Dept: INTERNAL MEDICINE CLINIC | Age: 47
End: 2022-08-05
Payer: MEDICAID

## 2022-08-05 VITALS
BODY MASS INDEX: 37.17 KG/M2 | DIASTOLIC BLOOD PRESSURE: 72 MMHG | TEMPERATURE: 96.5 F | OXYGEN SATURATION: 100 % | RESPIRATION RATE: 18 BRPM | SYSTOLIC BLOOD PRESSURE: 127 MMHG | HEART RATE: 74 BPM | HEIGHT: 62 IN | WEIGHT: 202 LBS

## 2022-08-05 DIAGNOSIS — R21 RASH AND NONSPECIFIC SKIN ERUPTION: ICD-10-CM

## 2022-08-05 DIAGNOSIS — J31.0 RHINITIS, CHRONIC: ICD-10-CM

## 2022-08-05 DIAGNOSIS — N92.6 IRREGULAR PERIODS: ICD-10-CM

## 2022-08-05 DIAGNOSIS — E78.2 MIXED HYPERLIPIDEMIA: ICD-10-CM

## 2022-08-05 DIAGNOSIS — F41.9 ANXIETY AND DEPRESSION: Primary | ICD-10-CM

## 2022-08-05 DIAGNOSIS — F32.A ANXIETY AND DEPRESSION: Primary | ICD-10-CM

## 2022-08-05 DIAGNOSIS — D25.9 UTERINE LEIOMYOMA, UNSPECIFIED LOCATION: ICD-10-CM

## 2022-08-05 DIAGNOSIS — R23.2 HOT FLASHES: ICD-10-CM

## 2022-08-05 PROCEDURE — 99214 OFFICE O/P EST MOD 30 MIN: CPT | Performed by: INTERNAL MEDICINE

## 2022-08-05 RX ORDER — CITALOPRAM 10 MG/1
10 TABLET ORAL DAILY
Qty: 90 TABLET | Refills: 2 | Status: SHIPPED | OUTPATIENT
Start: 2022-08-05

## 2022-08-05 RX ORDER — CLOTRIMAZOLE AND BETAMETHASONE DIPROPIONATE 10; .64 MG/G; MG/G
CREAM TOPICAL
Qty: 15 G | Refills: 4 | Status: SHIPPED | OUTPATIENT
Start: 2022-08-05

## 2022-08-05 RX ORDER — FLUTICASONE PROPIONATE 50 MCG
2 SPRAY, SUSPENSION (ML) NASAL DAILY
Qty: 3 EACH | Refills: 3 | Status: SHIPPED | OUTPATIENT
Start: 2022-08-05

## 2022-08-05 NOTE — PROGRESS NOTES
Dolly Hodgkin presents today for No chief complaint on file. Is someone accompanying this pt? no    Is the patient using any DME equipment during [address]? no    Depression Screening:  3 most recent PHQ Screens 2/1/2022   Little interest or pleasure in doing things Not at all   Feeling down, depressed, irritable, or hopeless Not at all   Total Score PHQ 2 0       Learning Assessment:  No flowsheet data found. Health Maintenance reviewed and discussed and ordered per Provider. Health Maintenance Due   Topic Date Due    DTaP/Tdap/Td series (1 - Tdap) Never done    Colorectal Cancer Screening Combo  Never done   . Coordination of Care:  1. Have you been to the ER, urgent care clinic since your last visit? Hospitalized since your last visit? no    2. Have you seen or consulted any other health care providers outside of the [address] [place] [address] since your last visit? Include any pap smears or colon screening. no    3. For patients aged 39-70: Has the patient had a colonoscopy? No     If the patient is female:    4. For patients aged 41-77: Has the patient had a mammogram within the past 2 years? Yes - no Care Gap present    5. For patients aged 21-65: Has the patient had a pap smear?  Yes - no Care Gap present

## 2022-08-06 NOTE — PROGRESS NOTES
HPI     Cha Garcia is here for routine follow-up of her anxiety and depression, which are doing well. She reports that when she saw GYN, there was no further testing, just discussion, she reports this is because the GYN office had not received results from her pelvic ultrasound and MRI. Her periods are becoming more irregular, sometimes coming early, other times coming late. On questioning, she does have night sweats and hot flashes. We discussed that clonidine or increasing her SSRI can help with this, but she wishes to wait until the cooler months to see if they improve. She needs a prescription for a corticosteroid nasal spray for her chronic rhinitis. She also has areas of eczema as well as areas of presumed skin fungal infections, requests refills for her medications. Past Medical History:   Diagnosis Date    Allergic rhinitis     Anxiety and depression     Family history of breast cancer in mother     Mild intermittent asthma     Mixed hyperlipidemia         Past Surgical History:   Procedure Laterality Date    HX LEEP PROCEDURE          Current Outpatient Medications   Medication Sig Dispense Refill    fluticasone propionate (FLONASE) 50 mcg/actuation nasal spray Take 2 Sprays by Both Nostrils route in the morning. 3 Each 3    clotrimazole-betamethasone (LOTRISONE) topical cream Pea- sized amount affected areas BID PRN 15 g 4    citalopram (CeleXA) 10 mg tablet Take 1 Tablet by mouth in the morning. 90 Tablet 2    clindamycin (CLINDAGEL) 1 % topical gel Pea-sized amount to affected areas twice daily as needed 1 mL 4    betamethasone dipropionate (DIPROSONE) 0.05 % topical cream Pea-sized amount to affected areas twice daily as needed 30 g 4    ibuprofen (MOTRIN) 800 mg tablet Take 1 Tablet by mouth three (3) times daily as needed (Take with food).  90 Tablet 1    albuterol (PROVENTIL HFA, VENTOLIN HFA, PROAIR HFA) 90 mcg/actuation inhaler 1 or 2 puffs p.o. every 4 hours as needed dyspnea or wheezing. 18 g 5    montelukast (Singulair) 10 mg tablet Take 1 Tablet by mouth daily. 90 Tablet 3    norethindrone-ethinyl estradiol (ORTHO-NOVUM 1-35 TAB, NORTREL 1-35 TAB) 1-35 mg-mcg tab Take 1 Tablet by mouth daily. 84 Tablet 3    cetirizine (ZYRTEC) 10 mg tablet Take 10 mg by mouth.  omega 3-DHA-EPA-fish oil 1,000 mg (120 mg-180 mg) capsule Take 1 Capsule by mouth daily.  COLLAGEN by Does Not Apply route.  FLAXSEED OIL PO Take 1,400 mg by mouth.  multivitamin (ONE A DAY) tablet Take 1 Tablet by mouth daily.  wheat dextrin/calcium/aspartam (BENEFIBER + CALCIUM SUGAR-FREE PO) Take  by mouth.  COD LIVER OIL PO Take  by mouth.           No Known Allergies     Social History     Socioeconomic History    Marital status: SINGLE     Spouse name: Not on file    Number of children: Not on file    Years of education: Not on file    Highest education level: Not on file   Occupational History    Not on file   Tobacco Use    Smoking status: Former     Packs/day: 0.10     Years: 3.00     Pack years: 0.30     Types: Cigarettes    Smokeless tobacco: Never   Vaping Use    Vaping Use: Never used   Substance and Sexual Activity    Alcohol use: Not on file    Drug use: Not on file    Sexual activity: Not on file   Other Topics Concern    Not on file   Social History Narrative    Not on file     Social Determinants of Health     Financial Resource Strain: Not on file   Food Insecurity: Not on file   Transportation Needs: Not on file   Physical Activity: Not on file   Stress: Not on file   Social Connections: Not on file   Intimate Partner Violence: Not on file   Housing Stability: Not on file        Family History   Problem Relation Age of Onset    Breast Cancer Mother         at 55    Diabetes Sister         type 2           Visit Vitals  /72   Pulse 74   Temp (!) 96.5 °F (35.8 °C) (Temporal)   Resp 18   Ht 5' 2\" (1.575 m)   Wt 202 lb (91.6 kg)   LMP 07/11/2022 (Approximate)   SpO2 100%   BMI 36.95 kg/m²        Review of Systems   Constitutional:  Negative for chills, fever and malaise/fatigue. Respiratory:  Negative for shortness of breath. Cardiovascular:  Negative for chest pain. Gastrointestinal:  Negative for abdominal pain and blood in stool. Genitourinary:  Negative for hematuria. Psychiatric/Behavioral:  Negative for depression. The patient is not nervous/anxious. Physical Exam  Constitutional:       General: She is not in acute distress. Appearance: She is obese. She is not ill-appearing. Eyes:      General: No scleral icterus. Conjunctiva/sclera: Conjunctivae normal.   Neck:      Comments: Bilateral carotid upstrokes normal to palpation. No cervical or supraclaviucuar LAD  Cardiovascular:      Rate and Rhythm: Normal rate and regular rhythm. Pulses: Normal pulses. Heart sounds: No friction rub. No gallop. Pulmonary:      Effort: Pulmonary effort is normal.      Breath sounds: Normal breath sounds. Abdominal:      General: Abdomen is flat. Palpations: Abdomen is soft. Tenderness: There is no abdominal tenderness. Musculoskeletal:      Cervical back: Neck supple. Comments: No clubbing, cyanosis, or edema. Skin:     General: Skin is warm and dry. Neurological:      Mental Status: She is alert and oriented to person, place, and time. Psychiatric:         Mood and Affect: Mood normal.                Diagnoses and all orders for this visit:    1. Anxiety and depression  Comments:  Controlled, continue current dose SSRI  Orders:  -     citalopram (CeleXA) 10 mg tablet; Take 1 Tablet by mouth in the morning. 2. Irregular periods  Comments:  Possibly perimenopausal, [address] with next labs. Orders:  -     FOLLICLE STIMULATING HORMONE; Future    3. Hot flashes  Comments:  Offered addition of clonidine or increase of Celexa dose to help with this, patient defers for now    4.  Uterine leiomyoma, unspecified location  Comments: On revealing otherwise MRI and pelvic ultrasound, but endometrial cells on Pap, repeat Pap next visit   Orders:  -     CBC WITH AUTOMATED DIFF; Future  -     URINALYSIS W/MICROSCOPIC; Future  -     METABOLIC PANEL, COMPREHENSIVE; Future    5. Mixed hyperlipidemia  Comments:  Fasting panel with next labs  Orders:  -     LIPID PANEL; Future    6. Rhinitis, chronic  Comments:  Corticosteroid nasal spray prescription sent  Orders:  -     fluticasone propionate (FLONASE) 50 mcg/actuation nasal spray; Take 2 Sprays by Both Nostrils route in the morning. 7. Rash and nonspecific skin eruption  Comments:  Refill topical therapies. Orders:  -     clotrimazole-betamethasone (LOTRISONE) topical cream; Pea- sized amount affected areas BID PRN         Follow-up and Dispositions    Return in about 6 months (around 2/5/2023) for physical PAP-labs 1 week before.               Dakota Coe MD

## 2022-10-26 ENCOUNTER — TRANSCRIBE ORDER (OUTPATIENT)
Dept: SCHEDULING | Age: 47
End: 2022-10-26

## 2022-10-26 DIAGNOSIS — Z12.31 VISIT FOR SCREENING MAMMOGRAM: Primary | ICD-10-CM

## 2022-11-25 ENCOUNTER — HOSPITAL ENCOUNTER (OUTPATIENT)
Dept: MAMMOGRAPHY | Age: 47
Discharge: HOME | End: 2022-11-25
Attending: INTERNAL MEDICINE
Payer: MEDICAID

## 2022-11-25 DIAGNOSIS — Z12.31 VISIT FOR SCREENING MAMMOGRAM: ICD-10-CM

## 2022-11-25 PROCEDURE — 77063 BREAST TOMOSYNTHESIS BI: CPT

## 2023-02-03 ENCOUNTER — APPOINTMENT (OUTPATIENT)
Dept: INTERNAL MEDICINE CLINIC | Age: 48
End: 2023-02-03

## 2023-02-04 LAB
A-G RATIO,AGRAT: 1.4 RATIO (ref 1.1–2.6)
ABSOLUTE LYMPHOCYTE COUNT, 10803: 3 K/UL (ref 1–4.8)
ALBUMIN SERPL-MCNC: 3.9 G/DL (ref 3.5–5)
ALP SERPL-CCNC: 56 U/L (ref 25–115)
ALT SERPL-CCNC: 13 U/L (ref 5–40)
ANION GAP SERPL CALC-SCNC: 10 MMOL/L (ref 3–15)
AST SERPL W P-5'-P-CCNC: 20 U/L (ref 10–37)
BACTERIA,BACTU: NEGATIVE
BASOPHILS # BLD: 0 K/UL (ref 0–0.2)
BASOPHILS NFR BLD: 1 % (ref 0–2)
BILIRUB SERPL-MCNC: 0.3 MG/DL (ref 0.2–1.2)
BILIRUB UR QL: NEGATIVE
BUN SERPL-MCNC: 7 MG/DL (ref 6–22)
CALCIUM SERPL-MCNC: 8.6 MG/DL (ref 8.4–10.5)
CHLORIDE SERPL-SCNC: 106 MMOL/L (ref 98–110)
CHOLEST SERPL-MCNC: 188 MG/DL (ref 110–200)
CLARITY: CLEAR
CO2 SERPL-SCNC: 25 MMOL/L (ref 20–32)
COLOR UR: YELLOW
CREAT SERPL-MCNC: 0.6 MG/DL (ref 0.5–1.2)
EOSINOPHIL # BLD: 0 K/UL (ref 0–0.5)
EOSINOPHIL NFR BLD: 1 % (ref 0–6)
EPITHELIAL,EPSU: NORMAL /HPF
ERYTHROCYTE [DISTWIDTH] IN BLOOD BY AUTOMATED COUNT: 14.1 % (ref 10–15.5)
FSH SERPL-ACNC: 9.7 MIU/ML
GLOBULIN,GLOB: 2.8 G/DL (ref 2–4)
GLOMERULAR FILTRATION RATE: >60 ML/MIN/1.73 SQ.M.
GLUCOSE SERPL-MCNC: 79 MG/DL (ref 70–99)
GLUCOSE UR QL: NEGATIVE MG/DL
GRANULOCYTES,GRANS: 42 % (ref 40–75)
HCT VFR BLD AUTO: 38.2 % (ref 35.1–48)
HDLC SERPL-MCNC: 2.8 MG/DL (ref 0–5)
HDLC SERPL-MCNC: 67 MG/DL
HGB BLD-MCNC: 11.6 G/DL (ref 11.7–16)
HGB UR QL STRIP: NEGATIVE
HYLINE CAST, 6014: NORMAL /LPF (ref 0–2)
KETONES UR QL STRIP.AUTO: NEGATIVE MG/DL
LDL/HDL RATIO,LDHD: 1.6
LDLC SERPL CALC-MCNC: 107 MG/DL (ref 50–99)
LEUKOCYTE ESTERASE: NEGATIVE
LYMPHOCYTES, LYMLT: 48 % (ref 20–45)
MCH RBC QN AUTO: 30 PG (ref 26–34)
MCHC RBC AUTO-ENTMCNC: 30 G/DL (ref 31–36)
MCV RBC AUTO: 100 FL (ref 80–99)
MONOCYTES # BLD: 0.6 K/UL (ref 0.1–1)
MONOCYTES NFR BLD: 9 % (ref 3–12)
NEUTROPHILS # BLD AUTO: 2.6 K/UL (ref 1.8–7.7)
NITRITE UR QL STRIP.AUTO: NEGATIVE
NON-HDL CHOLESTEROL, 011976: 121 MG/DL
PH UR STRIP: 7 PH (ref 5–8)
PLATELET # BLD AUTO: 293 K/UL (ref 140–440)
PMV BLD AUTO: 10.3 FL (ref 9–13)
POTASSIUM SERPL-SCNC: 4.2 MMOL/L (ref 3.5–5.5)
PROT SERPL-MCNC: 6.7 G/DL (ref 6.4–8.3)
PROT UR QL STRIP: NEGATIVE MG/DL
RBC # BLD AUTO: 3.82 M/UL (ref 3.8–5.2)
RBC #/AREA URNS HPF: NORMAL /HPF
SODIUM SERPL-SCNC: 141 MMOL/L (ref 133–145)
SP GR UR: 1.01 (ref 1–1.03)
TRIGL SERPL-MCNC: 71 MG/DL (ref 40–149)
UROBILINOGEN UR STRIP-MCNC: 0.2 MG/DL
VLDLC SERPL CALC-MCNC: 14 MG/DL (ref 8–30)
WBC # BLD AUTO: 6.2 K/UL (ref 4–11)
WBC URNS QL MICRO: NORMAL /HPF (ref 0–5)

## 2023-02-05 DIAGNOSIS — E78.2 MIXED HYPERLIPIDEMIA: ICD-10-CM

## 2023-02-05 DIAGNOSIS — N92.6 IRREGULAR PERIODS: ICD-10-CM

## 2023-02-05 DIAGNOSIS — D25.9 UTERINE LEIOMYOMA, UNSPECIFIED LOCATION: ICD-10-CM

## 2023-02-13 ENCOUNTER — OFFICE VISIT (OUTPATIENT)
Age: 48
End: 2023-02-13
Payer: MEDICAID

## 2023-02-13 VITALS
RESPIRATION RATE: 18 BRPM | HEIGHT: 62 IN | BODY MASS INDEX: 35.88 KG/M2 | HEART RATE: 79 BPM | DIASTOLIC BLOOD PRESSURE: 69 MMHG | OXYGEN SATURATION: 98 % | WEIGHT: 195 LBS | SYSTOLIC BLOOD PRESSURE: 121 MMHG | TEMPERATURE: 97.5 F

## 2023-02-13 DIAGNOSIS — R39.198 SUBJECTIVE CHANGE IN URINATION: ICD-10-CM

## 2023-02-13 DIAGNOSIS — Z00.00 ROUTINE PHYSICAL EXAMINATION: Primary | ICD-10-CM

## 2023-02-13 DIAGNOSIS — R63.4 WEIGHT LOSS: ICD-10-CM

## 2023-02-13 DIAGNOSIS — N94.6 DYSMENORRHEA: ICD-10-CM

## 2023-02-13 DIAGNOSIS — F41.1 GENERALIZED ANXIETY DISORDER: ICD-10-CM

## 2023-02-13 DIAGNOSIS — D25.9 UTERINE LEIOMYOMA, UNSPECIFIED LOCATION: ICD-10-CM

## 2023-02-13 DIAGNOSIS — J45.20 MILD INTERMITTENT ASTHMA, UNCOMPLICATED: ICD-10-CM

## 2023-02-13 DIAGNOSIS — J31.0 CHRONIC RHINITIS: ICD-10-CM

## 2023-02-13 DIAGNOSIS — D75.89 MACROCYTOSIS: ICD-10-CM

## 2023-02-13 PROCEDURE — 99396 PREV VISIT EST AGE 40-64: CPT | Performed by: INTERNAL MEDICINE

## 2023-02-13 PROCEDURE — PBSHW PR MEAS POST-VOIDING RESIDUAL URINE&/BLADDER CAP: Performed by: INTERNAL MEDICINE

## 2023-02-13 RX ORDER — IBUPROFEN 800 MG/1
800 TABLET ORAL 3 TIMES DAILY PRN
COMMUNITY
Start: 2022-02-02

## 2023-02-13 RX ORDER — CLINDAMYCIN PHOSPHATE 10 MG/G
GEL TOPICAL
COMMUNITY
Start: 2023-01-16

## 2023-02-13 RX ORDER — ALBUTEROL SULFATE 2.5 MG/3ML
2.5 SOLUTION RESPIRATORY (INHALATION) EVERY 6 HOURS PRN
COMMUNITY

## 2023-02-13 RX ORDER — ALBUTEROL SULFATE 90 UG/1
AEROSOL, METERED RESPIRATORY (INHALATION)
COMMUNITY
Start: 2022-02-01 | End: 2023-02-13 | Stop reason: SDUPTHER

## 2023-02-13 RX ORDER — CHLORAL HYDRATE 500 MG
1 CAPSULE ORAL DAILY
COMMUNITY

## 2023-02-13 RX ORDER — FLUTICASONE PROPIONATE 50 MCG
2 SPRAY, SUSPENSION (ML) NASAL DAILY
COMMUNITY
Start: 2022-08-05

## 2023-02-13 RX ORDER — CITALOPRAM 10 MG/1
10 TABLET ORAL DAILY
Qty: 30 TABLET | Refills: 3 | Status: SHIPPED | OUTPATIENT
Start: 2023-02-13

## 2023-02-13 RX ORDER — CLOTRIMAZOLE AND BETAMETHASONE DIPROPIONATE 10; .64 MG/G; MG/G
CREAM TOPICAL
COMMUNITY
Start: 2022-08-05

## 2023-02-13 RX ORDER — MONTELUKAST SODIUM 10 MG/1
10 TABLET ORAL DAILY
COMMUNITY
Start: 2022-02-01 | End: 2023-02-13

## 2023-02-13 RX ORDER — PHENOL 1.4 %
1 AEROSOL, SPRAY (ML) MUCOUS MEMBRANE DAILY
COMMUNITY

## 2023-02-13 RX ORDER — CETIRIZINE HYDROCHLORIDE 10 MG/1
10 TABLET ORAL DAILY
Qty: 90 TABLET | Refills: 3 | Status: SHIPPED | OUTPATIENT
Start: 2023-02-13 | End: 2024-02-08

## 2023-02-13 RX ORDER — MONTELUKAST SODIUM 10 MG/1
10 TABLET ORAL DAILY
Qty: 90 TABLET | Refills: 3 | Status: SHIPPED | OUTPATIENT
Start: 2023-02-13

## 2023-02-13 RX ORDER — BETAMETHASONE DIPROPIONATE 0.5 MG/G
CREAM TOPICAL
COMMUNITY
Start: 2022-02-10

## 2023-02-13 RX ORDER — ALBUTEROL SULFATE 90 UG/1
AEROSOL, METERED RESPIRATORY (INHALATION)
COMMUNITY
Start: 2023-01-16

## 2023-02-13 RX ORDER — CITALOPRAM 10 MG/1
10 TABLET ORAL DAILY
COMMUNITY
Start: 2022-08-05

## 2023-02-13 RX ORDER — ALBUTEROL SULFATE 90 UG/1
2 AEROSOL, METERED RESPIRATORY (INHALATION) 4 TIMES DAILY PRN
Qty: 18 G | Refills: 5 | Status: SHIPPED | OUTPATIENT
Start: 2023-02-13

## 2023-02-13 RX ORDER — IBUPROFEN 800 MG/1
800 TABLET ORAL 3 TIMES DAILY PRN
Qty: 90 TABLET | Refills: 0 | Status: SHIPPED | OUTPATIENT
Start: 2023-02-13

## 2023-02-13 ASSESSMENT — ENCOUNTER SYMPTOMS
BLOOD IN STOOL: 0
SHORTNESS OF BREATH: 0

## 2023-02-13 NOTE — PROGRESS NOTES
1. \"Have you been to the ER, urgent care clinic since your last visit? Hospitalized since your last visit? \" No    2. \"Have you seen or consulted any other health care providers outside of the [address] [place] [address] since your last visit? \" No     3. For patients aged 39-70: Has the patient had a colonoscopy / FIT/ Cologuard? No      If the patient is female:    4. For patients aged 41-77: Has the patient had a mammogram within the past 2 years? Yes - no Care Gap present      5. For patients aged 21-65: Has the patient had a pap smear?  Yes - no Care Gap present

## 2023-02-13 NOTE — PROGRESS NOTES
HPI     Benjamín Nunez is here for a routine physical exam.  She had requested a Pap, but I reviewed with her she had a normal Pap 1 year ago, so is not due for another 2 to 4 years barring problems. Her biggest concern is her fibroids. She has noticed a few months of increased urinary urgency and frequency without any UTI symptoms. She denies polydipsia or drinking is significantly more amount of fluids. She says it only happens during her monthly cycle. The Celexa is working very well for her, and she rarely has to use her rescue inhaler, but does need a refill on it. She needs all her medications refilled. She takes ibuprofen for aches and pains. She believes Zyrtec or cetirizine are now covered, so she asked me to send in a prescription for that as well. She reports deliberate weight loss. She has never had a colonoscopy, and I will send a referral to gastroenterology.         Past Medical History:   Diagnosis Date    Allergic rhinitis     Anxiety and depression     Family history of breast cancer in mother     Mild intermittent asthma     Mixed hyperlipidemia         Past Surgical History:   Procedure Laterality Date    LEEP          Current Outpatient Medications   Medication Sig Dispense Refill    COD LIVER OIL PO Take by mouth      ibuprofen (ADVIL;MOTRIN) 800 MG tablet Take 800 mg by mouth 3 times daily as needed      fluticasone (FLONASE) 50 MCG/ACT nasal spray 2 sprays by Nasal route daily      citalopram (CELEXA) 10 MG tablet Take 10 mg by mouth daily      clotrimazole-betamethasone (LOTRISONE) 1-0.05 % cream Pea- sized amount affected areas BID PRN      Multiple Vitamins-Minerals (MULTIVITAMIN WOMEN) TABS Take 1 tablet by mouth daily      albuterol (PROVENTIL) (2.5 MG/3ML) 0.083% nebulizer solution Take 2.5 mg by nebulization every 6 hours as needed for Wheezing      albuterol sulfate HFA (PROVENTIL;VENTOLIN;PROAIR) 108 (90 Base) MCG/ACT inhaler INHALE 1 TO 2 PUFFS BY MOUTH EVERY 4 HOURS AS NEEDED FOR DYSPNEA OR WHEEZING      clindamycin (CLINDAGEL) 1 % gel APPLY TOPICALLY TO ENTIRE FACE EVERY MORNING AS DIRECTED      tretinoin (RETIN-A) 0.025 % cream Apply topically nightly Apply topically nightly. betamethasone dipropionate 0.05 % cream Pea-sized amount to affected areas twice daily as needed      citalopram (CELEXA) 10 MG tablet Take 1 tablet by mouth daily 30 tablet 3    ibuprofen (ADVIL;MOTRIN) 800 MG tablet Take 1 tablet by mouth 3 times daily as needed for Pain 90 tablet 0    cetirizine (ZYRTEC) 10 MG tablet Take 1 tablet by mouth daily 90 tablet 3    albuterol sulfate HFA (VENTOLIN HFA) 108 (90 Base) MCG/ACT inhaler Inhale 2 puffs into the lungs 4 times daily as needed for Wheezing 18 g 5    norethindrone-ethinyl estradiol (ORTHO-NOVUM 7/7/7) 0.5/0.75/1-35 MG-MCG per tablet Take 1 tablet by mouth daily 3 packet 3    montelukast (SINGULAIR) 10 MG tablet Take 1 tablet by mouth daily 90 tablet 3    Omega-3 1000 MG CAPS Take 1 capsule by mouth daily (Patient not taking: Reported on 2/13/2023)       No current facility-administered medications for this visit.         No Known Allergies     Social History     Socioeconomic History    Marital status: Single     Spouse name: Not on file    Number of children: Not on file    Years of education: Not on file    Highest education level: Not on file   Occupational History    Not on file   Tobacco Use    Smoking status: Former     Packs/day: 0.10     Types: Cigarettes    Smokeless tobacco: Never   Substance and Sexual Activity    Alcohol use: Not on file    Drug use: Not on file    Sexual activity: Not on file   Other Topics Concern    Not on file   Social History Narrative    Not on file     Social Determinants of Health     Financial Resource Strain: Not on file   Food Insecurity: Not on file   Transportation Needs: Not on file   Physical Activity: Not on file   Stress: Not on file   Social Connections: Not on file   Intimate Partner Violence: Not on file   Housing Stability: Not on file        Family History   Problem Relation Age of Onset    Breast Cancer Mother         at 55    Diabetes Sister         type 2           /69   Pulse 79   Temp 97.5 °F (36.4 °C) (Temporal)   Resp 18   Ht 5' 2\" (1.575 m)   Wt 195 lb (88.5 kg)   LMP 01/13/2023   SpO2 98%   BMI 35.67 kg/m²      Review of Systems   Constitutional:  Negative for appetite change. Respiratory:  Negative for shortness of breath. Cardiovascular:  Negative for chest pain. Gastrointestinal:  Negative for blood in stool. Genitourinary:  Negative for hematuria. Psychiatric/Behavioral:  Negative for dysphoric mood. The patient is not nervous/anxious. Physical Exam  Constitutional:       General: She is not in acute distress. Appearance: She is obese. HENT:      Right Ear: Ear canal and external ear normal.      Left Ear: Tympanic membrane, ear canal and external ear normal.   Eyes:      General: No scleral icterus. Conjunctiva/sclera: Conjunctivae normal.   Neck:      Comments: Carotid upstrokes normal to palpation. Lymph: No cervical, supraclavicular, or axillary lymphadenopathy. Cardiovascular:      Rate and Rhythm: Normal rate and regular rhythm. Heart sounds: No murmur heard. No friction rub. No gallop. Pulmonary:      Effort: Pulmonary effort is normal.      Breath sounds: Normal breath sounds. Abdominal:      Palpations: Abdomen is soft. Tenderness: There is no abdominal tenderness. Genitourinary:     Comments: Breasts: No mass, discharge, or skin changes bilaterally. Musculoskeletal:      Cervical back: Neck supple. Comments: No clubbing, cyanosis, or edema. Calves nontender, no cords. Skin:     General: Skin is warm and dry. Neurological:      Mental Status: She is oriented to person, place, and time.    Psychiatric:         Mood and Affect: Mood normal.                Zara was seen today for annual exam.    Diagnoses and all orders for this visit:    Routine physical examination  Comments:  Non-smoker, normal blood pressure, normal breast exam.  Discussed Pap not indicated today since had normal Pap 1 year ago, check 2 to 4 years  Orders:  -     Lipid panel; Future  -     citalopram (CELEXA) 10 MG tablet; Take 1 tablet by mouth daily  -     norethindrone-ethinyl estradiol (ORTHO-NOVUM 7/7/7) 0.5/0.75/1-35 MG-MCG per tablet; Take 1 tablet by mouth daily    Subjective change in urination  Comments:  No UTI symptoms. Discussed uterine fibroids may be pressing on bladder. Check pelvic and bladder ultrasounds. Orders:  -     Ultrasound non-ob transvaginal; Future  -     [facility] POST-VOIDING RESIDUAL URINE&/BLADDER CAP    Generalized anxiety disorder  Comments:  Controlled, continue current medication, refilled  Orders:  -     citalopram (CELEXA) 10 MG tablet; Take 1 tablet by mouth daily    Mild intermittent asthma, uncomplicated  Comments:  Controlled, continue meds, refilled  Orders:  -     albuterol sulfate HFA (VENTOLIN HFA) 108 (90 Base) MCG/ACT inhaler; Inhale 2 puffs into the lungs 4 times daily as needed for Wheezing  -     montelukast (SINGULAIR) 10 MG tablet; Take 1 tablet by mouth daily    Macrocytosis  Comments:  Discussed could be related to excessive alcohol intake, try to cut down. Recheck CBC with [facility] and other pertinent labs shortly before next visit. Orders:  -     CBC; Future  -     Vitamin B12; Future  -     TSH; Future    Weight loss  Comments:  Deliberate per patient, check TSH  Orders:  -     TSH; Future    Dysmenorrhea  Comments:  NSAID refilled  Orders:  -     ibuprofen (ADVIL;MOTRIN) 800 MG tablet; Take 1 tablet by mouth 3 times daily as needed for Pain    Uterine leiomyoma, unspecified location  Comments:  Could be causing urinary symptoms, recheck transvaginal ultrasound. Chronic rhinitis  Comments:  Refill nasal spray  Orders:  -     cetirizine (ZYRTEC) 10 MG tablet;  Take 1 tablet by mouth daily  - montelukast (SINGULAIR) 10 MG tablet; Take 1 tablet by mouth daily         No follow-up provider specified.          Lulú Mcclendon MD

## 2023-03-06 ENCOUNTER — TELEPHONE (OUTPATIENT)
Age: 48
End: 2023-03-06

## 2023-03-06 DIAGNOSIS — Z00.00 ROUTINE PHYSICAL EXAMINATION: ICD-10-CM

## 2023-04-21 ENCOUNTER — TELEPHONE (OUTPATIENT)
Age: 48
End: 2023-04-21

## 2023-04-27 DIAGNOSIS — N92.6 IRREGULAR MENSTRUATION, UNSPECIFIED: Primary | ICD-10-CM

## 2023-04-27 RX ORDER — NORETHINDRONE AND ETHINYL ESTRADIOL 1 MG-35MCG
1 KIT ORAL DAILY
Qty: 1 PACKET | Refills: 3 | Status: CANCELLED | OUTPATIENT
Start: 2023-04-27

## 2023-04-27 RX ORDER — NORETHINDRONE AND ETHINYL ESTRADIOL 1 MG-35MCG
1 KIT ORAL DAILY
Qty: 3 PACKET | Refills: 3 | Status: SHIPPED | OUTPATIENT
Start: 2023-04-27

## 2023-04-27 NOTE — TELEPHONE ENCOUNTER
Patient states the name of her birth control is. Pirmella 1-35 (nore-estrogen) and would like to get a refill on this medication.

## 2023-08-02 ENCOUNTER — TELEPHONE (OUTPATIENT)
Age: 48
End: 2023-08-02

## 2023-08-02 RX ORDER — CLOTRIMAZOLE AND BETAMETHASONE DIPROPIONATE 10; .64 MG/G; MG/G
CREAM TOPICAL
Qty: 15 G | Refills: 0 | Status: SHIPPED | OUTPATIENT
Start: 2023-08-02

## 2023-08-02 NOTE — TELEPHONE ENCOUNTER
Received refill request via fax:    - clotrimazole-betamethasone (LOTRISONE) 1-0.05 % cream    Pharmacy: [facility]

## 2023-10-31 ENCOUNTER — TRANSCRIBE ORDERS (OUTPATIENT)
Facility: HOSPITAL | Age: 48
End: 2023-10-31

## 2023-10-31 DIAGNOSIS — Z12.31 SCREENING MAMMOGRAM, ENCOUNTER FOR: Primary | ICD-10-CM

## 2023-11-01 ENCOUNTER — TELEPHONE (OUTPATIENT)
Age: 48
End: 2023-11-01

## 2023-11-01 DIAGNOSIS — L70.9 ADULT ACNE: Primary | ICD-10-CM

## 2023-11-01 DIAGNOSIS — J31.0 CHRONIC RHINITIS: ICD-10-CM

## 2023-11-01 RX ORDER — CLINDAMYCIN PHOSPHATE 10 MG/G
GEL TOPICAL
Qty: 1 EACH | Refills: 5 | Status: SHIPPED | OUTPATIENT
Start: 2023-11-01

## 2023-11-01 RX ORDER — FLUTICASONE PROPIONATE 50 MCG
SPRAY, SUSPENSION (ML) NASAL
Qty: 16 G | Refills: 5 | Status: SHIPPED | OUTPATIENT
Start: 2023-11-01

## 2023-11-01 NOTE — TELEPHONE ENCOUNTER
Recommend the shingles shot at age 46, needs to schedule at pharmacy. It is a 2 part vaccine. It makes it less likely for her to have shingles, and it makes the chance of postherpetic neuralgia (pain that lasts for weeks, months, or even years after the rash is gone) less likely.

## 2023-11-01 NOTE — TELEPHONE ENCOUNTER
clindamycin (CLINDAGEL) 1 % gel    clotrimazole-betamethasone (LOTRISONE) 1-0.05 % cream    fluticasone (FLONASE) 50 MCG/ACT nasal spray    [facility] [address] [phone] [facility] [phone] [address], [address] [place] [id number]   Phone:  [phone]  Fax:  [phone]

## 2023-11-01 NOTE — TELEPHONE ENCOUNTER
Patient is about to turn 46 an didn't know if she should worry about getting the shingles shot soon or if she should wait until her appt in Feb to discuss more about this.

## 2023-11-01 NOTE — TELEPHONE ENCOUNTER
T/C to patient to inform her that Dr Gary Calderón recommends the shingles vaccine at age 46. She was informed that she would need to get the vaccine through a pharmacy and that it is given in a 2 part series. She was informed that the vaccine makes it less likely for her to get post-herpetic neuralgia. Patient was also informed that her prescriptions Nasonex, Clindamycin gel and Lotrisone cream were sent to her pharmacy today.

## 2023-11-27 ENCOUNTER — HOSPITAL ENCOUNTER (OUTPATIENT)
Facility: HOSPITAL | Age: 48
Discharge: HOME | End: 2023-11-30
Attending: INTERNAL MEDICINE
Payer: MEDICAID

## 2023-11-27 DIAGNOSIS — Z12.31 SCREENING MAMMOGRAM, ENCOUNTER FOR: ICD-10-CM

## 2023-11-27 PROCEDURE — 77063 BREAST TOMOSYNTHESIS BI: CPT

## 2023-12-28 DIAGNOSIS — J31.0 CHRONIC RHINITIS: ICD-10-CM

## 2023-12-28 RX ORDER — CETIRIZINE HYDROCHLORIDE 10 MG/1
10 TABLET ORAL DAILY
Qty: 90 TABLET | Refills: 3 | Status: SHIPPED | OUTPATIENT
Start: 2023-12-28 | End: 2024-12-22

## 2024-02-08 ENCOUNTER — NURSE ONLY (OUTPATIENT)
Age: 49
End: 2024-02-08

## 2024-02-08 DIAGNOSIS — Z00.00 ROUTINE PHYSICAL EXAMINATION: ICD-10-CM

## 2024-02-08 DIAGNOSIS — D75.89 MACROCYTOSIS: ICD-10-CM

## 2024-02-08 DIAGNOSIS — R63.4 WEIGHT LOSS: ICD-10-CM

## 2024-02-14 NOTE — PROGRESS NOTES
Zara Osuna presents today for Physical              1. \"Have you been to the ER, urgent care clinic since your last visit?  Hospitalized since your last visit?\" no    2. \"Have you seen or consulted any other health care providers outside of the [facility] since your last visit?\" no     3. For patients aged 45-75: Has the patient had a colonoscopy / FIT/ Cologuard? Yes - no Care Gap present      If the patient is female:    4. For patients aged 40-74: Has the patient had a mammogram within the past 2 years? Yes - no Care Gap present      5. For patients aged 21-65: Has the patient had a pap smear? Yes - no Care Gap present

## 2024-02-15 ENCOUNTER — OFFICE VISIT (OUTPATIENT)
Age: 49
End: 2024-02-15
Payer: MEDICAID

## 2024-02-15 VITALS
TEMPERATURE: 96.2 F | HEIGHT: 62 IN | SYSTOLIC BLOOD PRESSURE: 98 MMHG | WEIGHT: 178 LBS | HEART RATE: 77 BPM | DIASTOLIC BLOOD PRESSURE: 62 MMHG | BODY MASS INDEX: 32.76 KG/M2 | OXYGEN SATURATION: 100 % | RESPIRATION RATE: 18 BRPM

## 2024-02-15 DIAGNOSIS — J30.89 NON-SEASONAL ALLERGIC RHINITIS DUE TO OTHER ALLERGIC TRIGGER: ICD-10-CM

## 2024-02-15 DIAGNOSIS — Z00.00 ROUTINE PHYSICAL EXAMINATION: Primary | ICD-10-CM

## 2024-02-15 DIAGNOSIS — F41.1 GENERALIZED ANXIETY DISORDER: ICD-10-CM

## 2024-02-15 DIAGNOSIS — J45.20 MILD INTERMITTENT ASTHMA, UNCOMPLICATED: ICD-10-CM

## 2024-02-15 DIAGNOSIS — R21 RASH: ICD-10-CM

## 2024-02-15 DIAGNOSIS — N92.6 IRREGULAR MENSTRUATION, UNSPECIFIED: ICD-10-CM

## 2024-02-15 DIAGNOSIS — E66.09 CLASS 1 OBESITY DUE TO EXCESS CALORIES WITHOUT SERIOUS COMORBIDITY WITH BODY MASS INDEX (BMI) OF 32.0 TO 32.9 IN ADULT: ICD-10-CM

## 2024-02-15 DIAGNOSIS — L70.0 ACNE VULGARIS: ICD-10-CM

## 2024-02-15 PROCEDURE — 99396 PREV VISIT EST AGE 40-64: CPT | Performed by: INTERNAL MEDICINE

## 2024-02-15 RX ORDER — NORETHINDRONE AND ETHINYL ESTRADIOL 1 MG-35MCG
1 KIT ORAL DAILY
Qty: 3 PACKET | Refills: 3 | Status: SHIPPED | OUTPATIENT
Start: 2024-02-15

## 2024-02-15 RX ORDER — FLUTICASONE PROPIONATE 50 MCG
SPRAY, SUSPENSION (ML) NASAL
Qty: 16 G | Refills: 5 | Status: SHIPPED | OUTPATIENT
Start: 2024-02-15

## 2024-02-15 RX ORDER — ALBUTEROL SULFATE 90 UG/1
2 AEROSOL, METERED RESPIRATORY (INHALATION) 4 TIMES DAILY PRN
Qty: 18 G | Refills: 5 | Status: SHIPPED | OUTPATIENT
Start: 2024-02-15

## 2024-02-15 RX ORDER — CITALOPRAM HYDROBROMIDE 10 MG/1
10 TABLET ORAL DAILY
Qty: 90 TABLET | Refills: 2 | Status: SHIPPED | OUTPATIENT
Start: 2024-02-15

## 2024-02-15 RX ORDER — CETIRIZINE HYDROCHLORIDE 10 MG/1
10 TABLET ORAL DAILY
Qty: 90 TABLET | Refills: 3 | Status: SHIPPED | OUTPATIENT
Start: 2024-02-15 | End: 2024-02-15

## 2024-02-15 RX ORDER — CLOTRIMAZOLE AND BETAMETHASONE DIPROPIONATE 10; .64 MG/G; MG/G
CREAM TOPICAL
Qty: 15 G | Refills: 2 | Status: SHIPPED | OUTPATIENT
Start: 2024-02-15

## 2024-02-15 RX ORDER — BETAMETHASONE DIPROPIONATE 0.5 MG/G
CREAM TOPICAL
Qty: 60 G | Refills: 4 | Status: SHIPPED | OUTPATIENT
Start: 2024-02-15

## 2024-02-15 RX ORDER — CLINDAMYCIN PHOSPHATE 10 MG/G
GEL TOPICAL
Qty: 1 EACH | Refills: 5 | Status: SHIPPED | OUTPATIENT
Start: 2024-02-15

## 2024-02-15 RX ORDER — CETIRIZINE HYDROCHLORIDE 10 MG/1
10 TABLET ORAL DAILY
Qty: 90 TABLET | Refills: 3 | Status: SHIPPED | OUTPATIENT
Start: 2024-02-15 | End: 2025-02-09

## 2024-02-16 NOTE — PROGRESS NOTES
WES Osuna is here for routine physical exam.  Her mood is good on her current dose of SSRI.  She has lost weight deliberately without loss of appetite or dysgeusia.  She had her COVID and flu vaccines recently, and will call us with the dates so we can record those.  She is taking an iron supplement, denies restless legs or pica.  She has some right arm pain for several months now, helps when she takes Motrin.  No injury or activity trigger recalled.    Denies any new family history.    Past Medical History:   Diagnosis Date    Allergic rhinitis     Anxiety and depression     Family history of breast cancer in mother     Mild intermittent asthma     Mixed hyperlipidemia         Past Surgical History:   Procedure Laterality Date    LEEP          Current Outpatient Medications   Medication Sig Dispense Refill    albuterol sulfate HFA (VENTOLIN HFA) 108 (90 Base) MCG/ACT inhaler Inhale 2 puffs into the lungs 4 times daily as needed for Wheezing 18 g 5    betamethasone dipropionate 0.05 % cream Pea-sized amount to affected areas twice daily as needed 60 g 4    citalopram (CELEXA) 10 MG tablet Take 1 tablet by mouth daily 90 tablet 2    clindamycin 1 % gel APPLY TOPICALLY TO ENTIRE FACE EVERY MORNING AS DIRECTED 1 each 5    clotrimazole-betamethasone (LOTRISONE) 1-0.05 % cream APPLY A PEA SIZED AMOUNT TO AFFECTED AREAS TWICE DAILY AS NEEDED 15 g 2    fluticasone (FLONASE) 50 MCG/ACT nasal spray 1 or 2 sprays in each nostril daily 16 g 5    norethindrone-ethinyl estradiol (PIRMELLA 1/35) 1-35 MG-MCG per tablet Take 1 tablet by mouth daily 3 packet 3    cetirizine (ZYRTEC) 10 MG tablet Take 1 tablet by mouth daily 90 tablet 3    Flaxseed, Linseed, (FLAXSEED OIL PO) Take 1,400 mg by mouth      cetirizine (ZYRTEC) 10 MG tablet Take 1 tablet by mouth      COD LIVER OIL PO Take by mouth      ibuprofen (ADVIL;MOTRIN) 800 MG tablet Take 1 tablet by mouth 3 times daily as needed      Multiple Vitamins-Minerals

## 2024-03-27 NOTE — PROGRESS NOTES
[place], RN calls from OR.  [facility] per Dr. Holland pt to RTW without restrictions on Sunday October 29th.  [facility] pt's mother will come up to office to  letter stating as such.  Letter printed and given to YASMEEN Montanez to distribute   I can add her on April 9th at 3  Anh Winter Rosado DO     PA submitted for Tussionex was denied, patient will have to pay out of pocket.

## 2024-08-14 DIAGNOSIS — R21 RASH: ICD-10-CM

## 2024-08-14 RX ORDER — BETAMETHASONE DIPROPIONATE 0.5 MG/G
CREAM TOPICAL
Qty: 60 G | Refills: 4 | Status: SHIPPED | OUTPATIENT
Start: 2024-08-14

## 2024-08-16 ENCOUNTER — TELEPHONE (OUTPATIENT)
Facility: CLINIC | Age: 49
End: 2024-08-16

## 2024-08-16 NOTE — TELEPHONE ENCOUNTER
: betamethasone dipropionate 0.05 % cream  Pt  ins is requiring  rx to say the amount of grams pt needs to use and the location where it needs to be applied  too

## 2024-08-19 DIAGNOSIS — R21 RASH: ICD-10-CM

## 2024-08-19 RX ORDER — BETAMETHASONE DIPROPIONATE 0.5 MG/G
CREAM TOPICAL
Qty: 60 G | Refills: 4 | Status: SHIPPED | OUTPATIENT
Start: 2024-08-19

## 2024-08-28 NOTE — PROGRESS NOTES
Zara Osuna presents today for chronic condition.              1. \"Have you been to the ER, urgent care clinic since your last visit?  Hospitalized since your last visit?\" no    2. \"Have you seen or consulted any other health care providers outside of the [facility] since your last visit?\" no     3. For patients aged 45-75: Has the patient had a colonoscopy / FIT/ Cologuard? Yes - no Care Gap present      If the patient is female:    4. For patients aged 40-74: Has the patient had a mammogram within the past 2 years? Yes - no Care Gap present      5. For patients aged 21-65: Has the patient had a pap smear? Yes - no Care Gap present

## 2024-08-30 ENCOUNTER — OFFICE VISIT (OUTPATIENT)
Facility: CLINIC | Age: 49
End: 2024-08-30
Payer: MEDICAID

## 2024-08-30 VITALS
WEIGHT: 177 LBS | RESPIRATION RATE: 18 BRPM | HEIGHT: 62 IN | TEMPERATURE: 98.3 F | DIASTOLIC BLOOD PRESSURE: 58 MMHG | SYSTOLIC BLOOD PRESSURE: 121 MMHG | HEART RATE: 76 BPM | OXYGEN SATURATION: 97 % | BODY MASS INDEX: 32.57 KG/M2

## 2024-08-30 DIAGNOSIS — E78.2 MIXED HYPERLIPIDEMIA: ICD-10-CM

## 2024-08-30 DIAGNOSIS — Z51.81 THERAPEUTIC DRUG MONITORING: ICD-10-CM

## 2024-08-30 DIAGNOSIS — F41.1 GENERALIZED ANXIETY DISORDER: Primary | ICD-10-CM

## 2024-08-30 DIAGNOSIS — M25.511 CHRONIC RIGHT SHOULDER PAIN: ICD-10-CM

## 2024-08-30 DIAGNOSIS — G89.29 CHRONIC RIGHT SHOULDER PAIN: ICD-10-CM

## 2024-08-30 DIAGNOSIS — E66.09 CLASS 1 OBESITY DUE TO EXCESS CALORIES WITHOUT SERIOUS COMORBIDITY WITH BODY MASS INDEX (BMI) OF 32.0 TO 32.9 IN ADULT: ICD-10-CM

## 2024-08-30 PROCEDURE — 99214 OFFICE O/P EST MOD 30 MIN: CPT | Performed by: INTERNAL MEDICINE

## 2024-08-30 RX ORDER — IBUPROFEN 800 MG/1
800 TABLET, FILM COATED ORAL EVERY 8 HOURS PRN
Qty: 120 TABLET | Refills: 1 | Status: SHIPPED | OUTPATIENT
Start: 2024-08-30

## 2024-08-30 ASSESSMENT — PATIENT HEALTH QUESTIONNAIRE - PHQ9
2. FEELING DOWN, DEPRESSED OR HOPELESS: NOT AT ALL
1. LITTLE INTEREST OR PLEASURE IN DOING THINGS: NOT AT ALL
SUM OF ALL RESPONSES TO PHQ QUESTIONS 1-9: 0
SUM OF ALL RESPONSES TO PHQ9 QUESTIONS 1 & 2: 0
SUM OF ALL RESPONSES TO PHQ QUESTIONS 1-9: 0

## 2024-09-02 ASSESSMENT — ENCOUNTER SYMPTOMS
BLOOD IN STOOL: 0
SHORTNESS OF BREATH: 0

## 2024-09-02 NOTE — PROGRESS NOTES
Not on file   Stress: Not on file   Social Connections: Not on file   Intimate Partner Violence: Not on file   Housing Stability: Unknown (2/15/2024)    Housing Stability Vital Sign     Unable to Pay for Housing in the Last Year: Not on file     Number of Places Lived in the Last Year: Not on file     Unstable Housing in the Last Year: No        Family History   Problem Relation Age of Onset    Breast Cancer Mother         at 46    Diabetes Sister         type 2           BP (!) 121/58   Pulse 76   Temp 98.3 °F (36.8 °C) (Temporal)   Resp 18   Ht 1.575 m (5' 2\")   Wt 80.3 kg (177 lb)   LMP 08/20/2024 Comment: started  SpO2 97%   BMI 32.37 kg/m²      Review of Systems   Respiratory:  Negative for shortness of breath.    Cardiovascular:  Negative for chest pain.   Gastrointestinal:  Negative for blood in stool.   Genitourinary:  Negative for hematuria.   Psychiatric/Behavioral:  Negative for dysphoric mood. The patient is not nervous/anxious.         Physical Exam  Constitutional:       General: She is not in acute distress.     Appearance: She is obese. She is not ill-appearing.      Comments: Weight down 1 pound from last visit, down 25 pounds in 2 years.   Eyes:      General: No scleral icterus.     Conjunctiva/sclera: Conjunctivae normal.   Neck:      Comments: Carotid strokes normal to palpation.  No cervical or supraclavicular lymphadenopathy.  Cardiovascular:      Rate and Rhythm: Normal rate and regular rhythm.      Pulses: Normal pulses.      Heart sounds: No murmur heard.  Pulmonary:      Effort: Pulmonary effort is normal.      Breath sounds: Normal breath sounds.   Abdominal:      Palpations: Abdomen is soft. There is no mass.      Tenderness: There is no abdominal tenderness.   Musculoskeletal:      Comments: No clubbing, cyanosis, or edema.  Calves nontender, no cords.   Skin:     General: Skin is warm and dry.   Neurological:      Mental Status: She is alert and oriented to person, place, and

## 2024-11-18 ENCOUNTER — TRANSCRIBE ORDERS (OUTPATIENT)
Facility: HOSPITAL | Age: 49
End: 2024-11-18

## 2024-11-18 DIAGNOSIS — Z12.31 VISIT FOR SCREENING MAMMOGRAM: Primary | ICD-10-CM

## 2024-11-22 ENCOUNTER — TELEPHONE (OUTPATIENT)
Facility: CLINIC | Age: 49
End: 2024-11-22

## 2024-11-22 NOTE — TELEPHONE ENCOUNTER
----- Message from Patricia BATES sent at 11/22/2024  4:34 PM EST -----  Regarding: [facility] Appointment Request  [facility] Appointment Request    Patient needs appointment for [facility] Appointment Type: New to Provider.    Patient Requested Dates(s):Any day (As soon as possible)  Patient Requested Time:Between 7:00AM to 12:00PM  Provider Name:Radha Mazarieogs MD    Reason for Appointment Request: Established Patient - No appointments available during search  --------------------------------------------------------------------------------------------------------------------------    Relationship to Patient: Self     Call Back Information: OK to leave message on voicemail   Preferred Call Back Number: Phone [phone]

## 2024-12-16 ENCOUNTER — OFFICE VISIT (OUTPATIENT)
Facility: CLINIC | Age: 49
End: 2024-12-16

## 2024-12-16 VITALS
BODY MASS INDEX: 31.91 KG/M2 | DIASTOLIC BLOOD PRESSURE: 78 MMHG | HEIGHT: 62 IN | SYSTOLIC BLOOD PRESSURE: 115 MMHG | WEIGHT: 173.4 LBS | OXYGEN SATURATION: 98 % | HEART RATE: 76 BPM | RESPIRATION RATE: 18 BRPM

## 2024-12-16 DIAGNOSIS — Z11.3 SCREENING FOR STDS (SEXUALLY TRANSMITTED DISEASES): ICD-10-CM

## 2024-12-16 DIAGNOSIS — R21 RASH: ICD-10-CM

## 2024-12-16 DIAGNOSIS — Z13.29 SCREENING FOR THYROID DISORDER: ICD-10-CM

## 2024-12-16 DIAGNOSIS — Z13.228 SCREENING FOR METABOLIC DISORDER: ICD-10-CM

## 2024-12-16 DIAGNOSIS — J45.20 MILD INTERMITTENT ASTHMA, UNCOMPLICATED: ICD-10-CM

## 2024-12-16 DIAGNOSIS — J31.0 CHRONIC RHINITIS: ICD-10-CM

## 2024-12-16 DIAGNOSIS — Z13.1 SCREENING FOR DIABETES MELLITUS: ICD-10-CM

## 2024-12-16 DIAGNOSIS — Z13.21 ENCOUNTER FOR VITAMIN DEFICIENCY SCREENING: ICD-10-CM

## 2024-12-16 DIAGNOSIS — Z13.0 SCREENING FOR DEFICIENCY ANEMIA: ICD-10-CM

## 2024-12-16 DIAGNOSIS — N92.6 IRREGULAR MENSTRUATION, UNSPECIFIED: ICD-10-CM

## 2024-12-16 DIAGNOSIS — Z13.220 SCREENING FOR CHOLESTEROL LEVEL: ICD-10-CM

## 2024-12-16 DIAGNOSIS — F41.1 GENERALIZED ANXIETY DISORDER: Primary | ICD-10-CM

## 2024-12-16 PROBLEM — D25.0 SUBMUCOUS LEIOMYOMA OF UTERUS: Status: ACTIVE | Noted: 2024-12-16

## 2024-12-16 PROBLEM — M79.671 PAIN IN RIGHT FOOT: Status: ACTIVE | Noted: 2019-01-28

## 2024-12-16 PROBLEM — M54.50 LOW BACK PAIN: Status: ACTIVE | Noted: 2018-02-12

## 2024-12-16 PROBLEM — M54.31 RIGHT SIDED SCIATICA: Status: ACTIVE | Noted: 2024-12-16

## 2024-12-16 PROBLEM — F41.9 ANXIETY: Status: ACTIVE | Noted: 2024-12-16

## 2024-12-16 PROBLEM — L02.429 FURUNCLE OF LIMB, UNSPECIFIED: Status: ACTIVE | Noted: 2018-12-03

## 2024-12-16 PROBLEM — J30.9 ALLERGIC RHINITIS: Status: ACTIVE | Noted: 2020-06-29

## 2024-12-16 RX ORDER — FLUTICASONE PROPIONATE 50 MCG
SPRAY, SUSPENSION (ML) NASAL
Qty: 16 G | Refills: 5 | Status: SHIPPED | OUTPATIENT
Start: 2024-12-16

## 2024-12-16 RX ORDER — BETAMETHASONE DIPROPIONATE 0.5 MG/G
CREAM TOPICAL
Qty: 60 G | Refills: 4 | Status: SHIPPED | OUTPATIENT
Start: 2024-12-16

## 2024-12-16 RX ORDER — 1.1% SODIUM FLUORIDE PRESCRIPTION DENTAL CREAM 5 MG/G
CREAM DENTAL
COMMUNITY
Start: 2024-11-23

## 2024-12-16 RX ORDER — NORETHINDRONE AND ETHINYL ESTRADIOL 1 MG-35MCG
1 KIT ORAL DAILY
Qty: 3 PACKET | Refills: 3 | Status: SHIPPED | OUTPATIENT
Start: 2024-12-16

## 2024-12-16 RX ORDER — CLOTRIMAZOLE AND BETAMETHASONE DIPROPIONATE 10; .64 MG/G; MG/G
CREAM TOPICAL
Qty: 15 G | Refills: 2 | Status: SHIPPED | OUTPATIENT
Start: 2024-12-16

## 2024-12-16 RX ORDER — ALBUTEROL SULFATE 90 UG/1
2 INHALANT RESPIRATORY (INHALATION) 4 TIMES DAILY PRN
Qty: 18 G | Refills: 5 | Status: SHIPPED | OUTPATIENT
Start: 2024-12-16

## 2024-12-16 RX ORDER — MONTELUKAST SODIUM 10 MG/1
10 TABLET ORAL DAILY
Qty: 90 TABLET | Refills: 3 | Status: SHIPPED | OUTPATIENT
Start: 2024-12-16

## 2024-12-16 RX ORDER — CHLORHEXIDINE GLUCONATE ORAL RINSE 1.2 MG/ML
SOLUTION DENTAL
COMMUNITY
Start: 2024-11-23

## 2024-12-16 RX ORDER — CYCLOBENZAPRINE HCL 10 MG
10 TABLET ORAL EVERY 8 HOURS PRN
COMMUNITY
Start: 2024-05-16

## 2024-12-16 RX ORDER — CLINDAMYCIN PHOSPHATE 10 MG/G
GEL TOPICAL
Qty: 1 EACH | Refills: 5 | Status: SHIPPED | OUTPATIENT
Start: 2024-12-16

## 2024-12-16 RX ORDER — CITALOPRAM HYDROBROMIDE 10 MG/1
10 TABLET ORAL DAILY
Qty: 90 TABLET | Refills: 1 | Status: SHIPPED | OUTPATIENT
Start: 2024-12-16

## 2024-12-16 SDOH — ECONOMIC STABILITY: FOOD INSECURITY: WITHIN THE PAST 12 MONTHS, THE FOOD YOU BOUGHT JUST DIDN'T LAST AND YOU DIDN'T HAVE MONEY TO GET MORE.: PATIENT DECLINED

## 2024-12-16 SDOH — ECONOMIC STABILITY: INCOME INSECURITY: HOW HARD IS IT FOR YOU TO PAY FOR THE VERY BASICS LIKE FOOD, HOUSING, MEDICAL CARE, AND HEATING?: PATIENT DECLINED

## 2024-12-16 SDOH — ECONOMIC STABILITY: FOOD INSECURITY: WITHIN THE PAST 12 MONTHS, YOU WORRIED THAT YOUR FOOD WOULD RUN OUT BEFORE YOU GOT MONEY TO BUY MORE.: PATIENT DECLINED

## 2024-12-16 ASSESSMENT — ANXIETY QUESTIONNAIRES
GAD7 TOTAL SCORE: 1
1. FEELING NERVOUS, ANXIOUS, OR ON EDGE: NOT AT ALL
3. WORRYING TOO MUCH ABOUT DIFFERENT THINGS: NOT AT ALL
6. BECOMING EASILY ANNOYED OR IRRITABLE: SEVERAL DAYS
IF YOU CHECKED OFF ANY PROBLEMS ON THIS QUESTIONNAIRE, HOW DIFFICULT HAVE THESE PROBLEMS MADE IT FOR YOU TO DO YOUR WORK, TAKE CARE OF THINGS AT HOME, OR GET ALONG WITH OTHER PEOPLE: NOT DIFFICULT AT ALL
4. TROUBLE RELAXING: NOT AT ALL
2. NOT BEING ABLE TO STOP OR CONTROL WORRYING: NOT AT ALL
7. FEELING AFRAID AS IF SOMETHING AWFUL MIGHT HAPPEN: NOT AT ALL
5. BEING SO RESTLESS THAT IT IS HARD TO SIT STILL: NOT AT ALL

## 2024-12-16 ASSESSMENT — PATIENT HEALTH QUESTIONNAIRE - PHQ9
9. THOUGHTS THAT YOU WOULD BE BETTER OFF DEAD, OR OF HURTING YOURSELF: NOT AT ALL
6. FEELING BAD ABOUT YOURSELF - OR THAT YOU ARE A FAILURE OR HAVE LET YOURSELF OR YOUR FAMILY DOWN: NOT AT ALL
10. IF YOU CHECKED OFF ANY PROBLEMS, HOW DIFFICULT HAVE THESE PROBLEMS MADE IT FOR YOU TO DO YOUR WORK, TAKE CARE OF THINGS AT HOME, OR GET ALONG WITH OTHER PEOPLE: NOT DIFFICULT AT ALL
5. POOR APPETITE OR OVEREATING: NOT AT ALL
7. TROUBLE CONCENTRATING ON THINGS, SUCH AS READING THE NEWSPAPER OR WATCHING TELEVISION: SEVERAL DAYS
SUM OF ALL RESPONSES TO PHQ QUESTIONS 1-9: 2
2. FEELING DOWN, DEPRESSED OR HOPELESS: NOT AT ALL
SUM OF ALL RESPONSES TO PHQ QUESTIONS 1-9: 2
SUM OF ALL RESPONSES TO PHQ QUESTIONS 1-9: 2
4. FEELING TIRED OR HAVING LITTLE ENERGY: NOT AT ALL
SUM OF ALL RESPONSES TO PHQ9 QUESTIONS 1 & 2: 0
3. TROUBLE FALLING OR STAYING ASLEEP: SEVERAL DAYS
8. MOVING OR SPEAKING SO SLOWLY THAT OTHER PEOPLE COULD HAVE NOTICED. OR THE OPPOSITE, BEING SO FIGETY OR RESTLESS THAT YOU HAVE BEEN MOVING AROUND A LOT MORE THAN USUAL: NOT AT ALL
SUM OF ALL RESPONSES TO PHQ QUESTIONS 1-9: 2
1. LITTLE INTEREST OR PLEASURE IN DOING THINGS: NOT AT ALL

## 2024-12-16 NOTE — PROGRESS NOTES
\"Have you been to the ER, urgent care clinic since your last visit?  Hospitalized since your last visit?\"    NO    “Have you seen or consulted any other health care providers outside our system since your last visit?”    NO    Have you had a mammogram?”   NO, pt states scheduled 1/2/24    Date of last Mammogram: 11/27/2023

## 2024-12-16 NOTE — PROGRESS NOTES
General Office Visit Note    Assessment/Plan:   orders and follow up as documented in EMR    1. Generalized anxiety disorder  Comments:  Controlled on SSRI, continue therapy  Orders:  -     citalopram (CELEXA) 10 MG tablet; Take 1 tablet by mouth daily, Disp-90 tablet, R-1Normal  2. Mild intermittent asthma, uncomplicated  Comments:  Controlled, continue meds, refilled  Orders:  -     albuterol sulfate HFA (VENTOLIN HFA) 108 (90 Base) MCG/ACT inhaler; Inhale 2 puffs into the lungs 4 times daily as needed for Wheezing, Disp-18 g, R-5Normal  -     montelukast (SINGULAIR) 10 MG tablet; Take 1 tablet by mouth daily, Disp-90 tablet, R-3Normal  3. Rash  Comments:  Steroid cream for contact dermatitis, antifungal for rash under breasts.  Orders:  -     betamethasone dipropionate 0.05 % cream; 1/2 g to each area of eczema twice daily as needed--areas of eczema vary in number and location, Disp-60 g, R-4, Normal  -     clotrimazole-betamethasone (LOTRISONE) 1-0.05 % cream; APPLY A PEA SIZED AMOUNT TO AFFECTED AREAS TWICE DAILY AS NEEDED, Disp-15 g, R-2, Normal  4. Chronic rhinitis  Comments:  Refill nasal spray  Orders:  -     montelukast (SINGULAIR) 10 MG tablet; Take 1 tablet by mouth daily, Disp-90 tablet, R-3Normal  5. Irregular menstruation, unspecified  Comments:  Refill OCP  Orders:  -     norethindrone-ethinyl estradiol (PIRMELLA 1/35) 1-35 MG-MCG per tablet; Take 1 tablet by mouth daily, Disp-3 packet, R-3Normal  6. Screening for STDs (sexually transmitted diseases)  -     HIV 1/2 Ag/Ab, 4TH Generation,W Rflx Confirm; Future  -     T. pallidum Ab; Future  7. Screening for deficiency anemia  -     CBC; Future  8. Screening for thyroid disorder  -     TSH; Future  9. Encounter for vitamin deficiency screening  -     Vitamin D 25 Hydroxy; Future  10. Screening for diabetes mellitus  -     Hemoglobin A1C; Future  11. Screening for cholesterol level  -     Lipid Panel; Future  12. Screening for metabolic disorder  -

## 2025-01-02 ENCOUNTER — HOSPITAL ENCOUNTER (OUTPATIENT)
Facility: HOSPITAL | Age: 50
Discharge: HOME | End: 2025-01-02
Attending: INTERNAL MEDICINE
Payer: MEDICAID

## 2025-01-02 VITALS — BODY MASS INDEX: 32.2 KG/M2 | HEIGHT: 62 IN | WEIGHT: 175 LBS

## 2025-01-02 DIAGNOSIS — Z12.31 VISIT FOR SCREENING MAMMOGRAM: ICD-10-CM

## 2025-01-02 PROCEDURE — 77063 BREAST TOMOSYNTHESIS BI: CPT

## 2025-02-18 ENCOUNTER — HOSPITAL ENCOUNTER (OUTPATIENT)
Facility: HOSPITAL | Age: 50
Setting detail: SPECIMEN
Discharge: HOME | End: 2025-02-21

## 2025-02-18 LAB — SENTARA SPECIMEN COLLECTION: NORMAL

## 2025-02-18 PROCEDURE — 99001 SPECIMEN HANDLING PT-LAB: CPT

## 2025-02-19 LAB
A/G RATIO: 1.3 RATIO (ref 1.1–2.6)
ALBUMIN: 3.6 G/DL (ref 3.5–5)
ALP BLD-CCNC: 54 U/L (ref 25–115)
ALT SERPL-CCNC: 13 U/L (ref 5–40)
ANION GAP SERPL CALCULATED.3IONS-SCNC: 11 MMOL/L (ref 3–15)
AST SERPL-CCNC: 13 U/L (ref 10–37)
BILIRUB SERPL-MCNC: 0.1 MG/DL (ref 0.2–1.2)
BUN BLDV-MCNC: 13 MG/DL (ref 6–22)
CALCIUM SERPL-MCNC: 8.3 MG/DL (ref 8.4–10.5)
CHLORIDE BLD-SCNC: 108 MMOL/L (ref 98–110)
CHOLESTEROL, TOTAL: 177 MG/DL (ref 110–200)
CHOLESTEROL/HDL RATIO: 2.4 (ref 0–5)
CO2: 21 MMOL/L (ref 20–32)
CREAT SERPL-MCNC: 0.7 MG/DL (ref 0.5–1.2)
ESTIMATED AVERAGE GLUCOSE: 105 MG/DL (ref 91–123)
GFR, ESTIMATED: >60 ML/MIN/1.73 SQ.M.
GLOBULIN: 2.8 G/DL (ref 2–4)
GLUCOSE: 84 MG/DL (ref 70–99)
HBA1C MFR BLD: 5.3 % (ref 4.8–5.6)
HCT VFR BLD CALC: 35.4 % (ref 35.1–48)
HDLC SERPL-MCNC: 75 MG/DL
HEMOGLOBIN: 11.3 G/DL (ref 11.7–16)
HIV INTERPRETATION: NORMAL
HIV1/0/2 AB/AG: NON REACTIVE
LDL CHOLESTEROL: 90 MG/DL (ref 50–99)
LDL/HDL RATIO: 1.2
MCH RBC QN AUTO: 31 PG (ref 26–34)
MCHC RBC AUTO-ENTMCNC: 32 G/DL (ref 31–36)
MCV RBC AUTO: 97 FL (ref 80–99)
NON-HDL CHOLESTEROL: 102 MG/DL
PDW BLD-RTO: 14.3 % (ref 10–15.5)
PLATELET # BLD: 295 K/UL (ref 140–440)
PMV BLD AUTO: 10.4 FL (ref 9–13)
POTASSIUM SERPL-SCNC: 3.9 MMOL/L (ref 3.5–5.5)
RBC # BLD: 3.65 M/UL (ref 3.8–5.2)
SODIUM BLD-SCNC: 140 MMOL/L (ref 133–145)
TOTAL PROTEIN: 6.4 G/DL (ref 6.4–8.3)
TRIGL SERPL-MCNC: 57 MG/DL (ref 40–149)
TSH SERPL DL<=0.05 MIU/L-ACNC: 4.67 MCU/ML (ref 0.27–4.2)
VITAMIN D 25-HYDROXY: 49.8 NG/ML (ref 32–100)
VLDLC SERPL CALC-MCNC: 11 MG/DL (ref 8–30)
WBC # BLD: 6.2 K/UL (ref 4–11)

## 2025-02-20 LAB — T. PALLIDUM, IGG/IGM: NON REACTIVE

## 2025-02-25 ENCOUNTER — HOSPITAL ENCOUNTER (OUTPATIENT)
Facility: HOSPITAL | Age: 50
Setting detail: SPECIMEN
Discharge: HOME | End: 2025-02-28

## 2025-02-25 ENCOUNTER — HOSPITAL ENCOUNTER (OUTPATIENT)
Facility: HOSPITAL | Age: 50
Discharge: HOME | End: 2025-02-28
Payer: MEDICAID

## 2025-02-25 ENCOUNTER — TELEPHONE (OUTPATIENT)
Facility: CLINIC | Age: 50
End: 2025-02-25

## 2025-02-25 ENCOUNTER — OFFICE VISIT (OUTPATIENT)
Facility: CLINIC | Age: 50
End: 2025-02-25

## 2025-02-25 VITALS
HEIGHT: 62 IN | RESPIRATION RATE: 16 BRPM | BODY MASS INDEX: 32.2 KG/M2 | DIASTOLIC BLOOD PRESSURE: 76 MMHG | OXYGEN SATURATION: 97 % | WEIGHT: 175 LBS | SYSTOLIC BLOOD PRESSURE: 125 MMHG | HEART RATE: 76 BPM

## 2025-02-25 DIAGNOSIS — Z13.21 ENCOUNTER FOR VITAMIN DEFICIENCY SCREENING: ICD-10-CM

## 2025-02-25 DIAGNOSIS — D25.0 SUBMUCOUS LEIOMYOMA OF UTERUS: ICD-10-CM

## 2025-02-25 DIAGNOSIS — F41.9 ANXIETY AND DEPRESSION: ICD-10-CM

## 2025-02-25 DIAGNOSIS — F41.1 GENERALIZED ANXIETY DISORDER: ICD-10-CM

## 2025-02-25 DIAGNOSIS — F32.A ANXIETY AND DEPRESSION: ICD-10-CM

## 2025-02-25 DIAGNOSIS — R79.89 ELEVATED TSH: ICD-10-CM

## 2025-02-25 DIAGNOSIS — Z23 ENCOUNTER FOR IMMUNIZATION: ICD-10-CM

## 2025-02-25 DIAGNOSIS — Z00.01 ENCOUNTER FOR WELL ADULT EXAM WITH ABNORMAL FINDINGS: Primary | ICD-10-CM

## 2025-02-25 DIAGNOSIS — F32.2 CURRENT SEVERE EPISODE OF MAJOR DEPRESSIVE DISORDER WITHOUT PSYCHOTIC FEATURES WITHOUT PRIOR EPISODE (HCC): ICD-10-CM

## 2025-02-25 DIAGNOSIS — D64.9 ANEMIA, UNSPECIFIED TYPE: ICD-10-CM

## 2025-02-25 LAB — SENTARA SPECIMEN COLLECTION: NORMAL

## 2025-02-25 PROCEDURE — 76856 US EXAM PELVIC COMPLETE: CPT

## 2025-02-25 PROCEDURE — 99001 SPECIMEN HANDLING PT-LAB: CPT

## 2025-02-25 RX ORDER — FERROUS SULFATE 325(65) MG
325 TABLET, DELAYED RELEASE (ENTERIC COATED) ORAL
Qty: 90 TABLET | Refills: 3 | Status: SHIPPED | OUTPATIENT
Start: 2025-02-25

## 2025-02-25 SDOH — ECONOMIC STABILITY: FOOD INSECURITY: WITHIN THE PAST 12 MONTHS, THE FOOD YOU BOUGHT JUST DIDN'T LAST AND YOU DIDN'T HAVE MONEY TO GET MORE.: NEVER TRUE

## 2025-02-25 SDOH — ECONOMIC STABILITY: FOOD INSECURITY: WITHIN THE PAST 12 MONTHS, YOU WORRIED THAT YOUR FOOD WOULD RUN OUT BEFORE YOU GOT MONEY TO BUY MORE.: NEVER TRUE

## 2025-02-25 ASSESSMENT — ANXIETY QUESTIONNAIRES
6. BECOMING EASILY ANNOYED OR IRRITABLE: NOT AT ALL
IF YOU CHECKED OFF ANY PROBLEMS ON THIS QUESTIONNAIRE, HOW DIFFICULT HAVE THESE PROBLEMS MADE IT FOR YOU TO DO YOUR WORK, TAKE CARE OF THINGS AT HOME, OR GET ALONG WITH OTHER PEOPLE: NOT DIFFICULT AT ALL
3. WORRYING TOO MUCH ABOUT DIFFERENT THINGS: NEARLY EVERY DAY
1. FEELING NERVOUS, ANXIOUS, OR ON EDGE: NEARLY EVERY DAY
GAD7 TOTAL SCORE: 9
7. FEELING AFRAID AS IF SOMETHING AWFUL MIGHT HAPPEN: NOT AT ALL
2. NOT BEING ABLE TO STOP OR CONTROL WORRYING: NOT AT ALL
5. BEING SO RESTLESS THAT IT IS HARD TO SIT STILL: NOT AT ALL
4. TROUBLE RELAXING: NEARLY EVERY DAY

## 2025-02-25 ASSESSMENT — PATIENT HEALTH QUESTIONNAIRE - PHQ9
6. FEELING BAD ABOUT YOURSELF - OR THAT YOU ARE A FAILURE OR HAVE LET YOURSELF OR YOUR FAMILY DOWN: NOT AT ALL
10. IF YOU CHECKED OFF ANY PROBLEMS, HOW DIFFICULT HAVE THESE PROBLEMS MADE IT FOR YOU TO DO YOUR WORK, TAKE CARE OF THINGS AT HOME, OR GET ALONG WITH OTHER PEOPLE: NOT DIFFICULT AT ALL
1. LITTLE INTEREST OR PLEASURE IN DOING THINGS: NEARLY EVERY DAY
SUM OF ALL RESPONSES TO PHQ QUESTIONS 1-9: 21
7. TROUBLE CONCENTRATING ON THINGS, SUCH AS READING THE NEWSPAPER OR WATCHING TELEVISION: NEARLY EVERY DAY
4. FEELING TIRED OR HAVING LITTLE ENERGY: NEARLY EVERY DAY
9. THOUGHTS THAT YOU WOULD BE BETTER OFF DEAD, OR OF HURTING YOURSELF: NOT AT ALL
3. TROUBLE FALLING OR STAYING ASLEEP: NEARLY EVERY DAY
5. POOR APPETITE OR OVEREATING: NEARLY EVERY DAY
SUM OF ALL RESPONSES TO PHQ QUESTIONS 1-9: 21
8. MOVING OR SPEAKING SO SLOWLY THAT OTHER PEOPLE COULD HAVE NOTICED. OR THE OPPOSITE, BEING SO FIGETY OR RESTLESS THAT YOU HAVE BEEN MOVING AROUND A LOT MORE THAN USUAL: NEARLY EVERY DAY
2. FEELING DOWN, DEPRESSED OR HOPELESS: NEARLY EVERY DAY

## 2025-02-25 NOTE — PROGRESS NOTES
Well Adult Note  Name: Zara Osuna Today’s Date: 3/4/2025   MRN: [id number] Sex: Female   Age: 49 y.o. Ethnicity: Non- / Non    : 1975 Race: Black / African American      Zara Osuna is here for a well adult exam.       Assessment & Plan   Encounter for well adult exam with abnormal findings  Encounter for immunization  -     Influenza, FLUCELVAX Trivalent, (age 6 mo+) IM, Preservative Free, 0.5mL  -     Zoster, SHINGRIX, (50 yrs +), IM  Elevated TSH  -     TSH + Free T4 Panel; Future  Anemia, unspecified type  -     ferrous sulfate (FE TABS) 325 (65 Fe) MG EC tablet; Take 1 tablet by mouth daily (with breakfast) Take with 4 ounces of orange juice, or vitamin C supplement., Disp-90 tablet, R-3Normal  Generalized anxiety disorder  -     External Referral To Psychiatry  Anxiety and depression  -     External Referral To Psychiatry  Current severe episode of major depressive disorder without psychotic features without prior episode (HCC)  -     External Referral To Psychiatry  Submucous leiomyoma of uterus  -     External Referral To Gynecologic Oncology  Encounter for vitamin deficiency screening  -     Vitamin D 25 Hydroxy        Return in about 1 year (around 2026) for Well Woman wo/PAP, In Office (ONLY), Fasting Labs 1 Wk Prior.       Subjective   History:  See Problem List  Elevated TSH, will repeat with T4  Anemia    Pt states her step mother passed away . Pt is grieving at the moment. Pt states she does not want to see psychiatry, or a therapist. Pt states she have not had the opportunity to grieving appropriately due to having to work and manage the . Pt's Step mom was 92, and had endometrial cancer, but states she had CHF.     Review of Systems    Allergies   Allergen Reactions    Dust Mite Extract Other (See Comments) and Shortness Of Breath    Pollen Extract Hives, Itching and Shortness Of Breath    Diphenhydramine Hives    Oxycodone-Acetaminophen Itching

## 2025-02-26 ENCOUNTER — TELEPHONE (OUTPATIENT)
Facility: CLINIC | Age: 50
End: 2025-02-26

## 2025-02-26 LAB
T4 FREE: 0.9 NG/DL (ref 0.9–1.8)
TSH SERPL DL<=0.05 MIU/L-ACNC: 3.76 MCU/ML (ref 0.27–4.2)
VITAMIN D 25-HYDROXY: 52.6 NG/ML (ref 32–100)

## 2025-02-26 NOTE — TELEPHONE ENCOUNTER
Patient called stating the referral Dr. Maria G Cervantes, patient called the facility and they informed that there wasn't a provider there by that name. Patient provided information for another provider to have her referral sent to.    Dr. Calvin Alberto- [facility]    Fax: [phone]

## 2025-03-16 ENCOUNTER — PATIENT MESSAGE (OUTPATIENT)
Facility: CLINIC | Age: 50
End: 2025-03-16

## 2025-03-17 RX ORDER — CETIRIZINE HYDROCHLORIDE 10 MG/1
10 TABLET ORAL DAILY
Qty: 90 TABLET | Refills: 1 | Status: SHIPPED | OUTPATIENT
Start: 2025-03-17

## 2025-03-17 NOTE — TELEPHONE ENCOUNTER
Last Visit: 2/25/25   Next Appointment: none    Requested Prescriptions     Pending Prescriptions Disp Refills    cetirizine (ZYRTEC) 10 MG tablet       Sig: Take 1 tablet by mouth

## 2025-08-04 DIAGNOSIS — F41.1 GENERALIZED ANXIETY DISORDER: ICD-10-CM

## 2025-08-05 RX ORDER — CITALOPRAM HYDROBROMIDE 10 MG/1
10 TABLET ORAL DAILY
Qty: 90 TABLET | Refills: 1 | Status: SHIPPED | OUTPATIENT
Start: 2025-08-05